# Patient Record
Sex: FEMALE | Race: WHITE | NOT HISPANIC OR LATINO | URBAN - METROPOLITAN AREA
[De-identification: names, ages, dates, MRNs, and addresses within clinical notes are randomized per-mention and may not be internally consistent; named-entity substitution may affect disease eponyms.]

---

## 2020-12-29 ENCOUNTER — EMERGENCY (EMERGENCY)
Facility: HOSPITAL | Age: 50
LOS: 0 days | Discharge: HOME | End: 2020-12-29
Attending: STUDENT IN AN ORGANIZED HEALTH CARE EDUCATION/TRAINING PROGRAM | Admitting: EMERGENCY MEDICINE
Payer: MEDICAID

## 2020-12-29 VITALS
DIASTOLIC BLOOD PRESSURE: 81 MMHG | OXYGEN SATURATION: 96 % | HEIGHT: 66 IN | RESPIRATION RATE: 24 BRPM | SYSTOLIC BLOOD PRESSURE: 133 MMHG | HEART RATE: 85 BPM | WEIGHT: 199.96 LBS | TEMPERATURE: 97 F

## 2020-12-29 VITALS
OXYGEN SATURATION: 99 % | DIASTOLIC BLOOD PRESSURE: 65 MMHG | TEMPERATURE: 96 F | SYSTOLIC BLOOD PRESSURE: 141 MMHG | HEART RATE: 99 BPM | RESPIRATION RATE: 19 BRPM

## 2020-12-29 DIAGNOSIS — E03.9 HYPOTHYROIDISM, UNSPECIFIED: ICD-10-CM

## 2020-12-29 DIAGNOSIS — F43.10 POST-TRAUMATIC STRESS DISORDER, UNSPECIFIED: ICD-10-CM

## 2020-12-29 DIAGNOSIS — Z88.1 ALLERGY STATUS TO OTHER ANTIBIOTIC AGENTS STATUS: ICD-10-CM

## 2020-12-29 DIAGNOSIS — J45.901 UNSPECIFIED ASTHMA WITH (ACUTE) EXACERBATION: ICD-10-CM

## 2020-12-29 DIAGNOSIS — R06.00 DYSPNEA, UNSPECIFIED: ICD-10-CM

## 2020-12-29 DIAGNOSIS — Z20.828 CONTACT WITH AND (SUSPECTED) EXPOSURE TO OTHER VIRAL COMMUNICABLE DISEASES: ICD-10-CM

## 2020-12-29 DIAGNOSIS — F31.9 BIPOLAR DISORDER, UNSPECIFIED: ICD-10-CM

## 2020-12-29 DIAGNOSIS — F32.9 MAJOR DEPRESSIVE DISORDER, SINGLE EPISODE, UNSPECIFIED: ICD-10-CM

## 2020-12-29 LAB
ALBUMIN SERPL ELPH-MCNC: 4.3 G/DL — SIGNIFICANT CHANGE UP (ref 3.5–5.2)
ALP SERPL-CCNC: 100 U/L — SIGNIFICANT CHANGE UP (ref 30–115)
ALT FLD-CCNC: 14 U/L — SIGNIFICANT CHANGE UP (ref 0–41)
ANION GAP SERPL CALC-SCNC: 15 MMOL/L — HIGH (ref 7–14)
APAP SERPL-MCNC: <5 UG/ML — LOW (ref 10–30)
AST SERPL-CCNC: 17 U/L — SIGNIFICANT CHANGE UP (ref 0–41)
BASOPHILS # BLD AUTO: 0.09 K/UL — SIGNIFICANT CHANGE UP (ref 0–0.2)
BASOPHILS NFR BLD AUTO: 0.8 % — SIGNIFICANT CHANGE UP (ref 0–1)
BILIRUB SERPL-MCNC: 0.2 MG/DL — SIGNIFICANT CHANGE UP (ref 0.2–1.2)
BUN SERPL-MCNC: 11 MG/DL — SIGNIFICANT CHANGE UP (ref 10–20)
CALCIUM SERPL-MCNC: 9.7 MG/DL — SIGNIFICANT CHANGE UP (ref 8.5–10.1)
CHLORIDE SERPL-SCNC: 103 MMOL/L — SIGNIFICANT CHANGE UP (ref 98–110)
CO2 SERPL-SCNC: 21 MMOL/L — SIGNIFICANT CHANGE UP (ref 17–32)
CREAT SERPL-MCNC: 0.8 MG/DL — SIGNIFICANT CHANGE UP (ref 0.7–1.5)
EOSINOPHIL # BLD AUTO: 0.2 K/UL — SIGNIFICANT CHANGE UP (ref 0–0.7)
EOSINOPHIL NFR BLD AUTO: 1.8 % — SIGNIFICANT CHANGE UP (ref 0–8)
ETHANOL SERPL-MCNC: <10 MG/DL — SIGNIFICANT CHANGE UP
GLUCOSE SERPL-MCNC: 119 MG/DL — HIGH (ref 70–99)
HCT VFR BLD CALC: 42.1 % — SIGNIFICANT CHANGE UP (ref 37–47)
HGB BLD-MCNC: 14.1 G/DL — SIGNIFICANT CHANGE UP (ref 12–16)
IMM GRANULOCYTES NFR BLD AUTO: 0.4 % — HIGH (ref 0.1–0.3)
LYMPHOCYTES # BLD AUTO: 1.71 K/UL — SIGNIFICANT CHANGE UP (ref 1.2–3.4)
LYMPHOCYTES # BLD AUTO: 15.2 % — LOW (ref 20.5–51.1)
MCHC RBC-ENTMCNC: 30.9 PG — SIGNIFICANT CHANGE UP (ref 27–31)
MCHC RBC-ENTMCNC: 33.5 G/DL — SIGNIFICANT CHANGE UP (ref 32–37)
MCV RBC AUTO: 92.1 FL — SIGNIFICANT CHANGE UP (ref 81–99)
MONOCYTES # BLD AUTO: 0.27 K/UL — SIGNIFICANT CHANGE UP (ref 0.1–0.6)
MONOCYTES NFR BLD AUTO: 2.4 % — SIGNIFICANT CHANGE UP (ref 1.7–9.3)
NEUTROPHILS # BLD AUTO: 8.96 K/UL — HIGH (ref 1.4–6.5)
NEUTROPHILS NFR BLD AUTO: 79.4 % — HIGH (ref 42.2–75.2)
NRBC # BLD: 0 /100 WBCS — SIGNIFICANT CHANGE UP (ref 0–0)
PLATELET # BLD AUTO: 249 K/UL — SIGNIFICANT CHANGE UP (ref 130–400)
POTASSIUM SERPL-MCNC: 4 MMOL/L — SIGNIFICANT CHANGE UP (ref 3.5–5)
POTASSIUM SERPL-SCNC: 4 MMOL/L — SIGNIFICANT CHANGE UP (ref 3.5–5)
PROT SERPL-MCNC: 6.6 G/DL — SIGNIFICANT CHANGE UP (ref 6–8)
RBC # BLD: 4.57 M/UL — SIGNIFICANT CHANGE UP (ref 4.2–5.4)
RBC # FLD: 12.6 % — SIGNIFICANT CHANGE UP (ref 11.5–14.5)
SALICYLATES SERPL-MCNC: <0.3 MG/DL — LOW (ref 4–30)
SARS-COV-2 RNA SPEC QL NAA+PROBE: SIGNIFICANT CHANGE UP
SODIUM SERPL-SCNC: 139 MMOL/L — SIGNIFICANT CHANGE UP (ref 135–146)
WBC # BLD: 11.28 K/UL — HIGH (ref 4.8–10.8)
WBC # FLD AUTO: 11.28 K/UL — HIGH (ref 4.8–10.8)

## 2020-12-29 PROCEDURE — 93010 ELECTROCARDIOGRAM REPORT: CPT

## 2020-12-29 PROCEDURE — 90792 PSYCH DIAG EVAL W/MED SRVCS: CPT

## 2020-12-29 PROCEDURE — 99285 EMERGENCY DEPT VISIT HI MDM: CPT

## 2020-12-29 PROCEDURE — 71045 X-RAY EXAM CHEST 1 VIEW: CPT | Mod: 26

## 2020-12-29 PROCEDURE — 99053 MED SERV 10PM-8AM 24 HR FAC: CPT

## 2020-12-29 RX ORDER — ALBUTEROL 90 UG/1
1 AEROSOL, METERED ORAL ONCE
Refills: 0 | Status: COMPLETED | OUTPATIENT
Start: 2020-12-29 | End: 2020-12-29

## 2020-12-29 RX ORDER — IPRATROPIUM/ALBUTEROL SULFATE 18-103MCG
3 AEROSOL WITH ADAPTER (GRAM) INHALATION ONCE
Refills: 0 | Status: DISCONTINUED | OUTPATIENT
Start: 2020-12-29 | End: 2020-12-29

## 2020-12-29 RX ORDER — IPRATROPIUM/ALBUTEROL SULFATE 18-103MCG
3 AEROSOL WITH ADAPTER (GRAM) INHALATION
Refills: 0 | Status: COMPLETED | OUTPATIENT
Start: 2020-12-29 | End: 2020-12-29

## 2020-12-29 RX ORDER — ALPRAZOLAM 0.25 MG
0.5 TABLET ORAL ONCE
Refills: 0 | Status: DISCONTINUED | OUTPATIENT
Start: 2020-12-29 | End: 2020-12-29

## 2020-12-29 RX ORDER — DEXAMETHASONE 0.5 MG/5ML
10 ELIXIR ORAL ONCE
Refills: 0 | Status: COMPLETED | OUTPATIENT
Start: 2020-12-29 | End: 2020-12-29

## 2020-12-29 RX ORDER — IPRATROPIUM/ALBUTEROL SULFATE 18-103MCG
3 AEROSOL WITH ADAPTER (GRAM) INHALATION ONCE
Refills: 0 | Status: COMPLETED | OUTPATIENT
Start: 2020-12-29 | End: 2020-12-29

## 2020-12-29 RX ADMIN — Medication 10 MILLIGRAM(S): at 05:36

## 2020-12-29 RX ADMIN — Medication 3 MILLILITER(S): at 05:40

## 2020-12-29 RX ADMIN — Medication 3 MILLILITER(S): at 06:28

## 2020-12-29 RX ADMIN — Medication 3 MILLILITER(S): at 08:17

## 2020-12-29 RX ADMIN — ALBUTEROL 1 PUFF(S): 90 AEROSOL, METERED ORAL at 11:15

## 2020-12-29 RX ADMIN — Medication 3 MILLILITER(S): at 05:36

## 2020-12-29 RX ADMIN — Medication 0.5 MILLIGRAM(S): at 06:51

## 2020-12-29 NOTE — ED PROVIDER NOTE - PHYSICAL EXAMINATION
CONSTITUTIONAL: Well-developed; well-nourished; in no acute distress.   SKIN: warm, dry  HEAD: Normocephalic; atraumatic.  EYES: no conjunctival injection. EOMI.   ENT: No nasal discharge; airway clear.  NECK: Supple; non tender.  CARD: S1, S2 normal; no murmurs, gallops, or rubs. Regular rate and rhythm.   RESP: +wheezing throughout all lung fields   ABD: soft ntnd. no CVA TTP.   EXT: Normal ROM.  No LE edema.   LYMPH: No acute cervical adenopathy.  NEURO: Alert, oriented, grossly unremarkable. No FND.   PSYCH: Cooperative, appropriate.

## 2020-12-29 NOTE — ED BEHAVIORAL HEALTH ASSESSMENT NOTE - SUMMARY
50 year old female, never , has no children, with psychiatric history of PTSD (childhood trauma), Bipolar, last inpatient psych admission in May of 2017 for bipolar symptoms, currently not in treatment,  past medical history of asthma, hypothyroidism, PCOS presents to ED with asthma exacerbation, psychiatry is consulted for suicidal ideation with no intent or plan.    Underlying suicidal ideation expressed earlier are in the context of stressors. She is not exhibiting suicidal ideation. 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 Currently exhibiting depressive symptoms in the context of multiple psychosocial stressors including finance, limiting support. There is no active manic or psychotic symptoms noted. She is very guarded in discussing underlying PTSD trauma, but the presentation does not appear to be related to acute symptoms. Patient is guarded with some odd behaviors that are not inappropriate, with strong features of questionable underlying personality disorder.    Plan  There is no indication for inpatient psych admission,  Patient expresses no interest psychotropic medications, but would like to continue therapy, which was helpful in the past.   -there is no psychiatric contraindication for discharge  -Patient will be referred to [facility] Outpatient Partial program, [address] [place] [id number]   Patient will be reached by [facility] Partial program today for initial appointment date.

## 2020-12-29 NOTE — ED BEHAVIORAL HEALTH ASSESSMENT NOTE - DETAILS
Depression, and personality difficulty breathing. Spoke with primary team at [phone] [facility] See above See  plan
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
## 2020-12-29 NOTE — ED BEHAVIORAL HEALTH ASSESSMENT NOTE - DESCRIPTION
See above See HPI 1 year old female, never , has no children. For undergraduate degree from [employer],  for graduate degree she attended [employer]

## 2020-12-29 NOTE — ED PROVIDER NOTE - CARE PROVIDER_API CALL
Brett Meier  INTERNAL MEDICINE  [address] [place] [address], [address]  [place] [address]  Phone: [phone]  Fax: [phone]  Follow Up Time: 1-3 Days

## 2020-12-29 NOTE — ED BEHAVIORAL HEALTH ASSESSMENT NOTE - PAST PSYCHOTROPIC MEDICATION
Most recent regiment include Lithium, tegretol and Zoloft. Reportedly she experienced liver failure on tegretol.

## 2020-12-29 NOTE — CHART NOTE - NSCHARTNOTEFT_GEN_A_CORE
Pt is a 50 year old female who presented to the ED with a c/o cough and difficulty breathing x 1 week.  Pt reported a PMH of Asthma, Hypothyroidism and PCOS.      Pt also reported that she does not have a PCP.  At pt's request, ROBBI sent an email to the [facility] requesting that a staff member call pt and schedule an appointment for her with a PCP.      Supportive counseling was provided to pt.  ROBBI gave pt resources for OPM[facility], Medicaid, SNAP and food pantries.  Pt agreed to call ROBBI if any other needs arise.
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
## 2020-12-29 NOTE — ED ADULT NURSE NOTE - OBJECTIVE STATEMENT
Pt reports SOB and cough x 1 week with hx of asthma. Pt reports hx of PTSD, anxiety and recently moved from [place] to [place] due to [place].  Denies chest pain, fever, nausea, vomiting, and diarrhea. Pt Anxious, tachypnea, b/l wheezing.

## 2020-12-29 NOTE — ED PROVIDER NOTE - OBJECTIVE STATEMENT
51 y/o F PMHx asthma, hypothyroidism, PCOS presents to ED with asthma exacerbation. Pt reports she has been having cough and SOB for two weeks. Denies any chest pain, vomiting, nausea. No fevers at home. 51 y/o F PMHx asthma, hypothyroidism, PCOS presents to ED with asthma exacerbation. Pt reports she has been having cough and SOB for two weeks. Denies any chest pain, vomiting, nausea. No fevers at home. Pt ran out of nebs/albuterol at home prompting ED visit.

## 2020-12-29 NOTE — ED PROVIDER NOTE - NSFOLLOWUPINSTRUCTIONS_ED_ALL_ED_FT
Asthma    Asthma is a recurring condition in which the airways tighten and narrow, making it difficult to breath. Asthma exacerbations, also called asthma attacks, range from minor to life-threatening. Symptoms include wheezing, coughing, chest tightness, or shortness of breath. The diagnosis of asthma is made by a review of your medical history and a physical exam, but may involve additional testing. Asthma cannot be cured, but medicines and lifestyle changes can help control it. Avoid triggers of asthma which may include animal dander, pollen, mold, smoke, air pollutants, etc.     SEEK IMMEDIATE MEDICAL CARE IF YOU HAVE THE FOLLOWING SYMPTOMS: worsening of symptoms, symptoms that do not respond to medication, shortness of breath at rest, chest pain, bluish discoloration to lips or fingertips, lightheadedness/dizziness, or fever. Asthma    Asthma is a recurring condition in which the airways tighten and narrow, making it difficult to breath. Asthma exacerbations, also called asthma attacks, range from minor to life-threatening. Symptoms include wheezing, coughing, chest tightness, or shortness of breath. The diagnosis of asthma is made by a review of your medical history and a physical exam, but may involve additional testing. Asthma cannot be cured, but medicines and lifestyle changes can help control it. Avoid triggers of asthma which may include animal dander, pollen, mold, smoke, air pollutants, etc.     SEEK IMMEDIATE MEDICAL CARE IF YOU HAVE THE FOLLOWING SYMPTOMS: worsening of symptoms, symptoms that do not respond to medication, shortness of breath at rest, chest pain, bluish discoloration to lips or fingertips, lightheadedness/dizziness, or fever.      Depression    Depression is a mental illness that usually causes feelings of sadness, hopelessness, or helplessness. Some people with this disorder do not feel particularly sad but lose interest in doing things they used to enjoy. Major depressive disorder also can cause physical symptoms. It can interfere with work, school, relationships, and other normal everyday activities. If you were started on a medication, make sure to take exactly as prescribed and follow up with a psychiatrist.    SEEK IMMEDIATE MEDICAL CARE IF YOU HAVE ANY OF THE FOLLOWING SYMPTOMS: thoughts about hurting or killing yourself, thoughts about hurting or killing somebody else, hallucinations, or worsening depression.

## 2020-12-29 NOTE — BH SAFETY PLAN - CRISIS CLINICIAN NAME 1
[facility] Outpatient Partial program, [address] [place] [phone]

## 2020-12-29 NOTE — ED PROVIDER NOTE - NS ED ROS FT
Review of Systems:  CONSTITUTIONAL: No fever, No diaphoresis   SKIN: No rash  HEMATOLOGIC: No abnormal bleeding or bruising  EYES: No eye pain, No blurred vision  ENT: No sore throat, No neck pain, No rhinorrhea   RESPIRATORY: +shortness of breath, +cough  CARDIAC: No chest pain, No palpitations  GI: No abdominal pain, No nausea, No vomiting, No diarrhea, No constipation, No bright red blood per rectum or melena. No flank pain  : No dysuria, frequency, hematuria.   MUSCULOSKELETAL: No joint paint, No swelling, No back pain  NEUROLOGIC: No numbness, No focal weakness, No headache, No dizziness  All other systems negative, unless specified in HPI

## 2020-12-29 NOTE — ED PROVIDER NOTE - PATIENT PORTAL LINK FT
You can access the FollowMyHealth Patient Portal offered by [facility] by registering at the following website: [email or web address]. By joining [employer]’s FollowMyHealth portal, you will also be able to view your health information using other applications (apps) compatible with our system. You can access the FollowMyHealth Patient Portal offered by [facility] by registering at the following website: [email or web address]. By joining [email or web address]’s FollowMyHealth portal, you will also be able to view your health information using other applications (apps) compatible with our system.

## 2020-12-29 NOTE — ED PROVIDER NOTE - ATTENDING CONTRIBUTION TO CARE
50F h/o asthma, hypothyroidism, pcos, ptsd p/w asthma exac x sev wks. Cough & sob. Pt recently moved back to [place] from [place] ~2 mos ago, rpts asthma sx x sev weeks, ran out of her inhl medication, currently has no med ins. Has been hesitant to come to ED due to covid pandemic. Denies f/c, cp, nvd, abd pain, rash. No PMD.    PE:  nad  skin warm, dry  ncat  neck supple  speaking in short sentences, nl wob, decr air entry bl, diff wheezing bl, no rales/rhonchi  ctab no wrr  abd soft ntnd no palpable masses no rgr  back non-tender no cvat  ext no cce dpi  neuro aaox3 grossly nf exam

## 2020-12-29 NOTE — ED PROVIDER NOTE - SHIFT CHANGE DETAILS
f/u cxr, psych/SW consult in AM, reassess, dispo f/u cxr, sw recs, psych consult in AM, reassess, dispo f/u cxr, reassess, dispo

## 2020-12-29 NOTE — ED BEHAVIORAL HEALTH ASSESSMENT NOTE - OTHER PAST PSYCHIATRIC HISTORY (INCLUDE DETAILS REGARDING ONSET, COURSE OF ILLNESS, INPATIENT/OUTPATIENT TREATMENT)
Long history of PTSD (childhood trauma).  Psychiatric history of bipolar, last inpatient psych admission in May of 2017 for bipolar symptoms, currently not in treatment. Most recent regiment include Lithium, tegretol and Zoloft. Reportedly she experienced liver failure on tegretol.

## 2020-12-29 NOTE — ED PROVIDER NOTE - NSFOLLOWUPCLINICS_GEN_ALL_ED_FT
[facility] Pulmonolgy and Sleep Medicine  Pulmonology  [address] [place] [address], [place] [address]  Phone: [phone]  Fax:   Follow Up Time: Routine

## 2020-12-29 NOTE — ED PROVIDER NOTE - PROGRESS NOTE DETAILS
s/w ROBBI Richey, will come assess pt in ED called to bedside by RN, pt c/o anxiety and requesting to s/w ROBBI - s/w ROBBI Richey, will come assess pt in ED per ROBBI Richey pt will be contacted to get set up with PMD and medical ins options JR: patient reassessed at the bedside. Moderate inspiratory/expiratory wheezing RLL. Patient reports interval improvement in Sx. She requests eval by psych- they will come at 8:30am. Pt ok with waiting for psych eval. Will give duoneb tx x1 and reassess. DC: Mild end expiratory after nebs and duoneb. Will order another duoneb. Patient with his of PTSD, suicidal attempt in past, endorsing SI at this time and increased anxiety. Patient requesting to be evaluated by psychiatry. Consult placed. DC: Cleared by psychiatry with outpatient follow up. DC: Cleared by psychiatry with outpatient follow up. Dr. Byrnes will give partial program patient's contact info and they will call her to make an appointment.
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
## 2021-02-10 ENCOUNTER — APPOINTMENT (OUTPATIENT)
Age: 51
End: 2021-02-10
Payer: COMMERCIAL

## 2021-02-10 ENCOUNTER — OUTPATIENT (OUTPATIENT)
Dept: OUTPATIENT SERVICES | Facility: HOSPITAL | Age: 51
LOS: 1 days | Discharge: HOME | End: 2021-02-10

## 2021-02-10 VITALS
WEIGHT: 200 LBS | SYSTOLIC BLOOD PRESSURE: 127 MMHG | BODY MASS INDEX: 32.14 KG/M2 | HEART RATE: 87 BPM | DIASTOLIC BLOOD PRESSURE: 85 MMHG | HEIGHT: 66 IN | TEMPERATURE: 99.2 F

## 2021-02-10 DIAGNOSIS — Z78.9 OTHER SPECIFIED HEALTH STATUS: ICD-10-CM

## 2021-02-10 DIAGNOSIS — Z86.59 PERSONAL HISTORY OF OTHER MENTAL AND BEHAVIORAL DISORDERS: ICD-10-CM

## 2021-02-10 DIAGNOSIS — L40.9 PSORIASIS, UNSPECIFIED: ICD-10-CM

## 2021-02-10 DIAGNOSIS — R59.9 ENLARGED LYMPH NODES, UNSPECIFIED: ICD-10-CM

## 2021-02-10 PROCEDURE — 99203 OFFICE O/P NEW LOW 30 MIN: CPT | Mod: GC

## 2021-02-11 DIAGNOSIS — L60.9 NAIL DISORDER, UNSPECIFIED: ICD-10-CM

## 2021-02-11 DIAGNOSIS — Z00.00 ENCOUNTER FOR GENERAL ADULT MEDICAL EXAMINATION WITHOUT ABNORMAL FINDINGS: ICD-10-CM

## 2021-02-11 DIAGNOSIS — F32.9 MAJOR DEPRESSIVE DISORDER, SINGLE EPISODE, UNSPECIFIED: ICD-10-CM

## 2021-02-11 DIAGNOSIS — R59.9 ENLARGED LYMPH NODES, UNSPECIFIED: ICD-10-CM

## 2021-02-17 PROBLEM — Z86.59 HISTORY OF DEPRESSION: Status: RESOLVED | Noted: 2021-02-17 | Resolved: 2021-02-17

## 2021-02-17 PROBLEM — Z86.59 HISTORY OF ANXIETY: Status: RESOLVED | Noted: 2021-02-17 | Resolved: 2021-02-17

## 2021-02-17 PROBLEM — Z78.9 NON-SMOKER: Status: ACTIVE | Noted: 2021-02-17

## 2021-02-17 NOTE — HISTORY OF PRESENT ILLNESS
[FreeTextEntry1] : Pt here to establish care  [de-identified] : Ms. Lira is a 51 y/o female with pmhx of PCOS asthma, psoriatic arthritis, depression and anxiety, who comes to the clinic to establish care. She complains of a mobile, rubbery, nodule in her left chest wall, beginning 3 years ago, that was never looked at. She also states that she began menopause 2 years, LMP mid-septmeber of 2018, and she attributes exacerbations of her depression and anxiety to that She does admit to occasional, chills, night sweats, hot flashes. She has a history of hospitalization due to psychiatrics episodes. Last episode 1 week ago. She has a history of suicidality. She states that she has no suicidal plan, but does admit to occasional ideation. She was at [facility] ER on December 29th for an asthma exacerbation, discharged with Ventolin.

## 2021-02-17 NOTE — ASSESSMENT
[FreeTextEntry1] : Ms. Lira is a 49 y/o female with pmhx of PCOS asthma, psoriatic arthritis, depression and anxiety, who comes to the clinic to establish care. She complains of a mobile, rubbery, nodule in her left chest wall, beginning 3 years ago, that was never looked at. \par \par #1 Left Anterior Chest wall nodule \par -Referred for mammogram \par -Referred to surgery for possible biopsy \par -Follow up 1 month \par \par #2 Asthma \par -Refilled Venotlin rescue inhaler \par -Begin Advair 250 mg once daily \par -Follow up in 1 month \par \par #3 depression/anxiety/PTSD\par -referred to outpatient psychiatric services \par \par \par #4 HCM \par -Referred for mammogram \par -Referred for pap smear \par -Referred for colonoscopy \par -Ordered routine bloodwork \par \par BTC 1 month

## 2021-02-17 NOTE — PHYSICAL EXAM
[Normal] : affect was normal and insight and judgment were intact [de-identified] : Enlarged Cllavicular lymph node, size of wallnut. Mobile, hard, nontender.

## 2021-02-17 NOTE — HEALTH RISK ASSESSMENT
covid swab sent to lab   [Good] : ~his/her~ current health as good [Poor] : ~his/her~ mood as  poor [No] : No [Monthly or less (1 pt)] : Monthly or less (1 point) [No falls in past year] : Patient reported no falls in the past year [3] : 1) Little interest or pleasure doing things for nearly every day (3) [2] : 2) Feeling down, depressed, or hopeless for more than half of the days (2) [Change in mental status noted] : Change in mental status noted [Alone] : lives alone [Unemployed] : unemployed [Graduate School] : graduate school [Single] : single [] : No [Sexually Active] : not sexually active

## 2021-02-17 NOTE — REVIEW OF SYSTEMS
[Chills] : chills [Fatigue] : fatigue [Hot Flashes] : hot flashes [Night Sweats] : night sweats [Fever] : no fever [Recent Change In Weight] : ~T no recent weight change [Discharge] : no discharge [Pain] : no pain [Redness] : no redness [Dryness] : no dryness  [Vision Problems] : no vision problems [Itching] : no itching [Earache] : no earache [Hearing Loss] : no hearing loss [Nosebleed] : no nosebleeds [Hoarseness] : no hoarseness [Nasal Discharge] : no nasal discharge [Sore Throat] : no sore throat [Postnasal Drip] : no postnasal drip [Chest Pain] : no chest pain [Palpitations] : no palpitations [Leg Claudication] : no leg claudication [Lower Ext Edema] : no lower extremity edema [Orthopnea] : no orthopnea [Paroxysmal Nocturnal Dyspnea] : no paroxysmal nocturnal dyspnea [Shortness Of Breath] : no shortness of breath [Wheezing] : no wheezing [Cough] : no cough [Dyspnea on Exertion] : no dyspnea on exertion [Abdominal Pain] : no abdominal pain [Nausea] : no nausea [Constipation] : no constipation [Diarrhea] : diarrhea [Vomiting] : no vomiting [Heartburn] : no heartburn [Melena] : no melena [Dysuria] : no dysuria [Incontinence] : no incontinence [Nocturia] : no nocturia [Poor Libido] : libido not poor [Hematuria] : no hematuria [Frequency] : no frequency [Vaginal Discharge] : no vaginal discharge [Dysmenorrhea] : no dysmenorrhea [Joint Pain] : no joint pain [Joint Stiffness] : no joint stiffness [Joint Swelling] : no joint swelling [Muscle Weakness] : no muscle weakness [Muscle Pain] : no muscle pain [Back Pain] : no back pain [Itching] : no itching [Mole Changes] : no mole changes [Nail Changes] : no nail changes [Hair Changes] : no hair changes [Skin Rash] : no skin rash [Headache] : no headache [Dizziness] : no dizziness [Fainting] : no fainting [Confusion] : no confusion [Memory Loss] : no memory loss [Unsteady Walking] : no ataxia [Suicidal] : not suicidal [Insomnia] : no insomnia [Anxiety] : no anxiety [Depression] : no depression [Easy Bleeding] : no easy bleeding [Easy Bruising] : no easy bruising [Swollen Glands] : no swollen glands

## 2021-04-09 ENCOUNTER — APPOINTMENT (OUTPATIENT)
Dept: GASTROENTEROLOGY | Facility: CLINIC | Age: 51
End: 2021-04-09

## 2021-04-23 ENCOUNTER — APPOINTMENT (OUTPATIENT)
Dept: GASTROENTEROLOGY | Facility: CLINIC | Age: 51
End: 2021-04-23
Payer: MEDICAID

## 2021-04-23 ENCOUNTER — OUTPATIENT (OUTPATIENT)
Dept: OUTPATIENT SERVICES | Facility: HOSPITAL | Age: 51
LOS: 1 days | Discharge: HOME | End: 2021-04-23

## 2021-04-23 PROCEDURE — 99204 OFFICE O/P NEW MOD 45 MIN: CPT | Mod: 95,GE

## 2021-04-23 NOTE — ASSESSMENT
[FreeTextEntry1] : 50 year old female with PMH of asthma and depression is here for colorectal cancer screening. on ROS pt endorses RUQ abdominal pain, intermittent, colicky, increased with food intake. she denies nausea, vomiting, hematemesis, melena or hematochezia. she denies wt loss or anemia.\par \par # CRC screening: average risk\par - will schedule for colonoscopy (pt understands risk / benefits and alternatives)\par - will send prep to the pharmacy \par \par # RUQ pain: r/o biliary colic \par - will get RUQ US

## 2021-04-23 NOTE — HISTORY OF PRESENT ILLNESS
[de-identified] : 50 year old female with PMH of asthma and depression is here for colorectal cancer screening. on ROS pt endorses RUQ abdominal pain, intermittent, colicky, increased with food intake. she denies nausea, vomiting, hematemesis, melena or hematochezia. she denies wt loss or anemia.\par endorses 1 BM daily of formed brown stool. \par \par FH: CRC in maternal grandmother at age of 93.\par no prior EGD/Colonoscopy.

## 2021-04-23 NOTE — REASON FOR VISIT
[Home] : at home, [unfilled] , at the time of the visit. [[facility] ([facility]] : at the medical office located in  [Verbal consent obtained from patient] : the patient, [unfilled] [Initial Evaluation] : an initial evaluation

## 2021-04-28 DIAGNOSIS — Z00.00 ENCOUNTER FOR GENERAL ADULT MEDICAL EXAMINATION WITHOUT ABNORMAL FINDINGS: ICD-10-CM

## 2021-07-23 ENCOUNTER — NON-APPOINTMENT (OUTPATIENT)
Age: 51
End: 2021-07-23

## 2021-08-11 ENCOUNTER — APPOINTMENT (OUTPATIENT)
Dept: INTERNAL MEDICINE | Facility: CLINIC | Age: 51
End: 2021-08-11
Payer: MEDICAID

## 2021-08-11 PROCEDURE — ZZZZZ: CPT

## 2021-08-16 NOTE — REVIEW OF SYSTEMS
[Chills] : chills [Fatigue] : fatigue [Hot Flashes] : hot flashes [Night Sweats] : night sweats [Fever] : no fever [Recent Change In Weight] : ~T no recent weight change [Discharge] : no discharge [Pain] : no pain [Redness] : no redness [Dryness] : no dryness  [Vision Problems] : no vision problems [Earache] : no earache [Itching] : no itching [Hearing Loss] : no hearing loss [Nosebleed] : no nosebleeds [Hoarseness] : no hoarseness [Nasal Discharge] : no nasal discharge [Sore Throat] : no sore throat [Postnasal Drip] : no postnasal drip [Chest Pain] : no chest pain [Palpitations] : no palpitations [Leg Claudication] : no leg claudication [Lower Ext Edema] : no lower extremity edema [Orthopnea] : no orthopnea [Paroxysmal Nocturnal Dyspnea] : no paroxysmal nocturnal dyspnea [Wheezing] : no wheezing [Shortness Of Breath] : no shortness of breath [Cough] : no cough [Dyspnea on Exertion] : no dyspnea on exertion [Abdominal Pain] : no abdominal pain [Nausea] : no nausea [Constipation] : no constipation [Diarrhea] : diarrhea [Vomiting] : no vomiting [Heartburn] : no heartburn [Melena] : no melena [Dysuria] : no dysuria [Incontinence] : no incontinence [Nocturia] : no nocturia [Poor Libido] : libido not poor [Hematuria] : no hematuria [Frequency] : no frequency [Vaginal Discharge] : no vaginal discharge [Dysmenorrhea] : no dysmenorrhea [Joint Stiffness] : no joint stiffness [Joint Pain] : no joint pain [Joint Swelling] : no joint swelling [Muscle Weakness] : no muscle weakness [Muscle Pain] : no muscle pain [Back Pain] : no back pain [Itching] : no itching [Mole Changes] : no mole changes [Nail Changes] : no nail changes [Hair Changes] : no hair changes [Skin Rash] : no skin rash [Dizziness] : no dizziness [Headache] : no headache [Fainting] : no fainting [Confusion] : no confusion [Memory Loss] : no memory loss [Unsteady Walking] : no ataxia [Suicidal] : not suicidal [Insomnia] : no insomnia [Anxiety] : no anxiety [Depression] : no depression [Easy Bleeding] : no easy bleeding [Easy Bruising] : no easy bruising [Swollen Glands] : no swollen glands

## 2021-08-16 NOTE — HISTORY OF PRESENT ILLNESS
[Home] : at home, [unfilled] , at the time of the visit. [Medical Office: ([facility])___] : at the medical office located in  [Verbal consent obtained from patient] : the patient, [unfilled] [FreeTextEntry1] : Phone follow up [de-identified] : Ms. Lira is a 49 y/o female with pmhx of PCOS asthma, psoriatic arthritis, depression and anxiety, presented for phone follow up. Patient repoprts increased level of anxiety. She is under care of her therapist. She is denying any suicidal thought.\par Her asthma seems to be better controlled.

## 2021-08-16 NOTE — ASSESSMENT
[FreeTextEntry1] : Ms. Lira is a 49 y/o female with pmhx of S asthma, psoriatic arthritis, depression and anxiety. Patient reports that she was unable to do most of the recommended plan.\par \par #1 Left Anterior Chest wall nodule \par - mammogram \par -Referred to surgery for possible biopsy \par \par \par #2 Asthma \par -Continue Ventolin\par -Begin Advair 250 mg once daily \par \par \par #3 depression/anxiety/PTSD\par -referred to outpatient psychiatric services \par \par \par #4 HCM \par -Referred for mammogram \par -Referred for pap smear \par -Referred for colonoscopy \par - routine blood work ordered. \par \par

## 2021-11-26 ENCOUNTER — LABORATORY RESULT (OUTPATIENT)
Age: 51
End: 2021-11-26

## 2021-11-26 ENCOUNTER — TRANSCRIPTION ENCOUNTER (OUTPATIENT)
Age: 51
End: 2021-11-26

## 2021-11-29 ENCOUNTER — APPOINTMENT (OUTPATIENT)
Dept: INTERNAL MEDICINE | Facility: CLINIC | Age: 51
End: 2021-11-29
Payer: COMMERCIAL

## 2021-11-29 ENCOUNTER — OUTPATIENT (OUTPATIENT)
Dept: OUTPATIENT SERVICES | Facility: HOSPITAL | Age: 51
LOS: 1 days | Discharge: HOME | End: 2021-11-29

## 2021-11-29 VITALS
DIASTOLIC BLOOD PRESSURE: 87 MMHG | SYSTOLIC BLOOD PRESSURE: 134 MMHG | BODY MASS INDEX: 32.14 KG/M2 | TEMPERATURE: 98.6 F | HEIGHT: 66 IN | HEART RATE: 79 BPM | WEIGHT: 200 LBS

## 2021-11-29 LAB
ALBUMIN SERPL ELPH-MCNC: 4.6 G/DL
ALP BLD-CCNC: 114 U/L
ALT SERPL-CCNC: 18 U/L
ANION GAP SERPL CALC-SCNC: 14 MMOL/L
APPEARANCE: CLEAR
AST SERPL-CCNC: 22 U/L
BASOPHILS # BLD AUTO: 0.14 K/UL
BASOPHILS NFR BLD AUTO: 2.1 %
BILIRUB SERPL-MCNC: 0.4 MG/DL
BILIRUBIN URINE: NEGATIVE
BLOOD URINE: NEGATIVE
BUN SERPL-MCNC: 14 MG/DL
CALCIUM SERPL-MCNC: 9.6 MG/DL
CHLORIDE SERPL-SCNC: 104 MMOL/L
CHOLEST SERPL-MCNC: 250 MG/DL
CO2 SERPL-SCNC: 25 MMOL/L
COLOR: NORMAL
CREAT SERPL-MCNC: 0.8 MG/DL
CREAT SPEC-SCNC: 28 MG/DL
EOSINOPHIL # BLD AUTO: 0.73 K/UL
EOSINOPHIL NFR BLD AUTO: 11 %
ESTIMATED AVERAGE GLUCOSE: 97 MG/DL
GLUCOSE QUALITATIVE U: NEGATIVE
GLUCOSE SERPL-MCNC: 78 MG/DL
HBA1C MFR BLD HPLC: 5 %
HCT VFR BLD CALC: 40.1 %
HDLC SERPL-MCNC: 85 MG/DL
HGB BLD-MCNC: 13.1 G/DL
IMM GRANULOCYTES NFR BLD AUTO: 0.3 %
KETONES URINE: NEGATIVE
LDLC SERPL CALC-MCNC: 150 MG/DL
LEUKOCYTE ESTERASE URINE: ABNORMAL
LYMPHOCYTES # BLD AUTO: 2.15 K/UL
LYMPHOCYTES NFR BLD AUTO: 32.3 %
MAN DIFF?: NORMAL
MCHC RBC-ENTMCNC: 30.3 PG
MCHC RBC-ENTMCNC: 32.7 G/DL
MCV RBC AUTO: 92.6 FL
MICROALBUMIN 24H UR DL<=1MG/L-MCNC: <1.2 MG/DL
MICROALBUMIN/CREAT 24H UR-RTO: NORMAL MG/G
MONOCYTES # BLD AUTO: 0.38 K/UL
MONOCYTES NFR BLD AUTO: 5.7 %
NEUTROPHILS # BLD AUTO: 3.23 K/UL
NEUTROPHILS NFR BLD AUTO: 48.6 %
NITRITE URINE: NEGATIVE
NONHDLC SERPL-MCNC: 165 MG/DL
PH URINE: 7.5
PLATELET # BLD AUTO: 277 K/UL
POTASSIUM SERPL-SCNC: 4.5 MMOL/L
PROT SERPL-MCNC: 7 G/DL
PROTEIN URINE: NEGATIVE
RBC # BLD: 4.33 M/UL
RBC # FLD: 12.3 %
SODIUM SERPL-SCNC: 143 MMOL/L
SPECIFIC GRAVITY URINE: 1
T4 FREE SERPL-MCNC: 1.2 NG/DL
TRIGL SERPL-MCNC: 106 MG/DL
TSH SERPL-ACNC: 1.7 UIU/ML
UROBILINOGEN URINE: NORMAL
WBC # FLD AUTO: 6.65 K/UL

## 2021-11-29 PROCEDURE — 99213 OFFICE O/P EST LOW 20 MIN: CPT | Mod: GC

## 2021-11-29 RX ORDER — FLUTICASONE PROPIONATE AND SALMETEROL 50; 250 UG/1; UG/1
250-50 POWDER RESPIRATORY (INHALATION)
Qty: 1 | Refills: 3 | Status: COMPLETED | COMMUNITY
Start: 2021-02-10 | End: 2021-11-29

## 2021-11-30 DIAGNOSIS — G47.30 SLEEP APNEA, UNSPECIFIED: ICD-10-CM

## 2021-11-30 DIAGNOSIS — Z00.00 ENCOUNTER FOR GENERAL ADULT MEDICAL EXAMINATION WITHOUT ABNORMAL FINDINGS: ICD-10-CM

## 2021-12-01 NOTE — HISTORY OF PRESENT ILLNESS
[FreeTextEntry1] :  for the follow up, had covid vaccine 1 dose in may, never received second dose, asthma under control.\par Patient complaints on sleep problems, was previously diagnosed with sleep apnea. Wants to repeat sleep study. [de-identified] : Ms. Lira is a 52 y/o female with pmhx of PCOS asthma, psoriatic arthritis, depression and anxiety, who comes to the clinic  for the follow up, had covid vaccine 1 dose in may, never received second dose, asthma under control.\par Patient complaints on sleep problems, was previously diagnosed with sleep apnea. Wants to repeat sleep study.\par C/o anxiety, has not seen a psychiatrist after moving to [place].

## 2021-12-01 NOTE — ASSESSMENT
[FreeTextEntry1] : Ms. Lira is a 52 y/o female with pmhx of PCOS asthma, psoriatic arthritis, depression and anxiety, who comes to the clinic tofollow up\par \par #1 Left Anterior Chest wall nodule \par -Referred for mammogram \par -Referred to surgery for possible biopsy \par -Follow up 1 month \par \par #2 Asthma \par -Refilled Venotlin rescue inhaler \par -refilled Advair 250 mg once daily \par \par \par #3 depression/anxiety/PTSD\par -referred to outpatient psychiatric services \par \par \par #4 HCM \par -Referred for mammogram \par -Referred for pap smear \par -Referred for colonoscopy \par -Ordered routine bloodwork \par \par Meds renewed.

## 2021-12-01 NOTE — PHYSICAL EXAM
[Normal] : affect was normal and insight and judgment were intact [de-identified] : Enlarged Cllavicular lymph node, size of wallnut. Mobile, hard, nontender.

## 2021-12-30 ENCOUNTER — NON-APPOINTMENT (OUTPATIENT)
Age: 51
End: 2021-12-30

## 2022-05-16 NOTE — ED PROVIDER NOTE - NS_EDPROVIDERDISPOUSERTYPE_ED_A_ED
Pre Dialysis Assessment: Alert and oriented, conversant.      Pre Weight and Post Weight: 56.6Kg/54.5kg      Location/Access/Attempts: Right jujular tunneled double lumen HD catheter     Dressing status/changed: WNL/ last changed 5/9/22     Ultra-filtration Goal/ Actual:  1-2L/2L     Treatment Length:3.5hrs     Dialysate (K+/Ca) Indicate if changed during treatment: 2k/Ca     Summary of Treatment:      > Pt tolerated treatment w/o complications.     > Net UF removed 2L.     > VSS post treatment, report given to primary RN.     Attending Attestation (For Attendings USE Only)...

## 2022-06-28 ENCOUNTER — EMERGENCY (EMERGENCY)
Facility: HOSPITAL | Age: 52
LOS: 0 days | Discharge: HOME | End: 2022-06-28
Attending: EMERGENCY MEDICINE | Admitting: EMERGENCY MEDICINE

## 2022-06-28 VITALS
WEIGHT: 199.96 LBS | HEIGHT: 66 IN | HEART RATE: 80 BPM | RESPIRATION RATE: 18 BRPM | TEMPERATURE: 100 F | SYSTOLIC BLOOD PRESSURE: 151 MMHG | OXYGEN SATURATION: 96 % | DIASTOLIC BLOOD PRESSURE: 89 MMHG

## 2022-06-28 DIAGNOSIS — J45.909 UNSPECIFIED ASTHMA, UNCOMPLICATED: ICD-10-CM

## 2022-06-28 DIAGNOSIS — R07.89 OTHER CHEST PAIN: ICD-10-CM

## 2022-06-28 DIAGNOSIS — F12.90 CANNABIS USE, UNSPECIFIED, UNCOMPLICATED: ICD-10-CM

## 2022-06-28 DIAGNOSIS — M79.662 PAIN IN LEFT LOWER LEG: ICD-10-CM

## 2022-06-28 DIAGNOSIS — Z88.8 ALLERGY STATUS TO OTHER DRUGS, MEDICAMENTS AND BIOLOGICAL SUBSTANCES STATUS: ICD-10-CM

## 2022-06-28 DIAGNOSIS — Z88.1 ALLERGY STATUS TO OTHER ANTIBIOTIC AGENTS STATUS: ICD-10-CM

## 2022-06-28 LAB
ALBUMIN SERPL ELPH-MCNC: 4.3 G/DL — SIGNIFICANT CHANGE UP (ref 3.5–5.2)
ALP SERPL-CCNC: 102 U/L — SIGNIFICANT CHANGE UP (ref 30–115)
ALT FLD-CCNC: 11 U/L — SIGNIFICANT CHANGE UP (ref 0–41)
ANION GAP SERPL CALC-SCNC: 13 MMOL/L — SIGNIFICANT CHANGE UP (ref 7–14)
AST SERPL-CCNC: 14 U/L — SIGNIFICANT CHANGE UP (ref 0–41)
BASOPHILS # BLD AUTO: 0.09 K/UL — SIGNIFICANT CHANGE UP (ref 0–0.2)
BASOPHILS NFR BLD AUTO: 1.2 % — HIGH (ref 0–1)
BILIRUB SERPL-MCNC: 0.2 MG/DL — SIGNIFICANT CHANGE UP (ref 0.2–1.2)
BUN SERPL-MCNC: 15 MG/DL — SIGNIFICANT CHANGE UP (ref 10–20)
CALCIUM SERPL-MCNC: 9.5 MG/DL — SIGNIFICANT CHANGE UP (ref 8.5–10.1)
CHLORIDE SERPL-SCNC: 104 MMOL/L — SIGNIFICANT CHANGE UP (ref 98–110)
CO2 SERPL-SCNC: 26 MMOL/L — SIGNIFICANT CHANGE UP (ref 17–32)
CREAT SERPL-MCNC: 1.1 MG/DL — SIGNIFICANT CHANGE UP (ref 0.7–1.5)
D DIMER BLD IA.RAPID-MCNC: 167 NG/ML DDU — SIGNIFICANT CHANGE UP (ref 0–230)
EGFR: 60 ML/MIN/1.73M2 — SIGNIFICANT CHANGE UP
EOSINOPHIL # BLD AUTO: 0.55 K/UL — SIGNIFICANT CHANGE UP (ref 0–0.7)
EOSINOPHIL NFR BLD AUTO: 7.6 % — SIGNIFICANT CHANGE UP (ref 0–8)
GLUCOSE SERPL-MCNC: 97 MG/DL — SIGNIFICANT CHANGE UP (ref 70–99)
HCT VFR BLD CALC: 36.3 % — LOW (ref 37–47)
HGB BLD-MCNC: 12.5 G/DL — SIGNIFICANT CHANGE UP (ref 12–16)
IMM GRANULOCYTES NFR BLD AUTO: 0.3 % — SIGNIFICANT CHANGE UP (ref 0.1–0.3)
LYMPHOCYTES # BLD AUTO: 2.49 K/UL — SIGNIFICANT CHANGE UP (ref 1.2–3.4)
LYMPHOCYTES # BLD AUTO: 34.3 % — SIGNIFICANT CHANGE UP (ref 20.5–51.1)
MCHC RBC-ENTMCNC: 31.3 PG — HIGH (ref 27–31)
MCHC RBC-ENTMCNC: 34.4 G/DL — SIGNIFICANT CHANGE UP (ref 32–37)
MCV RBC AUTO: 91 FL — SIGNIFICANT CHANGE UP (ref 81–99)
MONOCYTES # BLD AUTO: 0.42 K/UL — SIGNIFICANT CHANGE UP (ref 0.1–0.6)
MONOCYTES NFR BLD AUTO: 5.8 % — SIGNIFICANT CHANGE UP (ref 1.7–9.3)
NEUTROPHILS # BLD AUTO: 3.68 K/UL — SIGNIFICANT CHANGE UP (ref 1.4–6.5)
NEUTROPHILS NFR BLD AUTO: 50.8 % — SIGNIFICANT CHANGE UP (ref 42.2–75.2)
NRBC # BLD: 0 /100 WBCS — SIGNIFICANT CHANGE UP (ref 0–0)
PLATELET # BLD AUTO: 230 K/UL — SIGNIFICANT CHANGE UP (ref 130–400)
POTASSIUM SERPL-MCNC: 4.1 MMOL/L — SIGNIFICANT CHANGE UP (ref 3.5–5)
POTASSIUM SERPL-SCNC: 4.1 MMOL/L — SIGNIFICANT CHANGE UP (ref 3.5–5)
PROT SERPL-MCNC: 6.2 G/DL — SIGNIFICANT CHANGE UP (ref 6–8)
RBC # BLD: 3.99 M/UL — LOW (ref 4.2–5.4)
RBC # FLD: 12.8 % — SIGNIFICANT CHANGE UP (ref 11.5–14.5)
SODIUM SERPL-SCNC: 143 MMOL/L — SIGNIFICANT CHANGE UP (ref 135–146)
TROPONIN T SERPL-MCNC: <0.01 NG/ML — SIGNIFICANT CHANGE UP
WBC # BLD: 7.25 K/UL — SIGNIFICANT CHANGE UP (ref 4.8–10.8)
WBC # FLD AUTO: 7.25 K/UL — SIGNIFICANT CHANGE UP (ref 4.8–10.8)

## 2022-06-28 PROCEDURE — 93010 ELECTROCARDIOGRAM REPORT: CPT

## 2022-06-28 PROCEDURE — 71045 X-RAY EXAM CHEST 1 VIEW: CPT | Mod: 26

## 2022-06-28 PROCEDURE — 99285 EMERGENCY DEPT VISIT HI MDM: CPT

## 2022-06-28 NOTE — ED ADULT TRIAGE NOTE - CHIEF COMPLAINT QUOTE
BIBA with complaints of left leg x few days and now with bruising/swelling. Now with chest pain in triage

## 2022-06-28 NOTE — ED PROVIDER NOTE - NS ED ROS FT
CONSTITUTIONAL: Negatve   SKIN: Negatve   HEAD: Negatve   EYES: Negatve   ENT: Negatve   NECK: Negatve   CARD:see hpi  RESP:Negatve   ABD: Negatve   EXT: see hpi  LYMPH: Negatve   NEURO: Negatve   PSYCH: Negatve

## 2022-06-28 NOTE — ED PROVIDER NOTE - PATIENT PORTAL LINK FT
You can access the FollowMyHealth Patient Portal offered by [facility] by registering at the following website: [email or web address]. By joining [employer]’s FollowMyHealth portal, you will also be able to view your health information using other applications (apps) compatible with our system.

## 2022-06-28 NOTE — ED PROVIDER NOTE - PROGRESS NOTE DETAILS
ekg non ischemic, cxr nad, labs and dimer neg.   results explained/given to pt.   return inst given.

## 2022-06-28 NOTE — ED PROVIDER NOTE - OBJECTIVE STATEMENT
52 yo f w hx of asthma co left leg pain, cramp like, sore feeling, mostly in left lateral and posterior calf. for several days. chest pain, soreness while in triage. not on oral hormones. smokes mj occasionally. powerwalks, plays guitar, active. no prolonged immobility. hx of dvt in family, none herself.

## 2022-06-28 NOTE — ED ADULT NURSE NOTE - OBJECTIVE STATEMENT
Patient c/o LLE pain. States swelling to the extremity, LOVELACE, pain when walking distance x1 wk. C/o bruising by toe with no known injury, and new onset chest pain. Family HX of DVT. Patient believes she has developed one,

## 2022-09-20 ENCOUNTER — APPOINTMENT (OUTPATIENT)
Dept: DERMATOLOGY | Facility: CLINIC | Age: 52
End: 2022-09-20

## 2022-09-20 ENCOUNTER — OUTPATIENT (OUTPATIENT)
Dept: OUTPATIENT SERVICES | Facility: HOSPITAL | Age: 52
LOS: 1 days | Discharge: HOME | End: 2022-09-20

## 2022-09-20 DIAGNOSIS — L40.9 PSORIASIS, UNSPECIFIED: ICD-10-CM

## 2022-09-20 PROCEDURE — 99204 OFFICE O/P NEW MOD 45 MIN: CPT | Mod: GC

## 2022-09-20 NOTE — END OF VISIT
[] : Resident [FreeTextEntry3] : localized psoriasis, try vitamin D analog, patient wishes to minimize steroid use

## 2022-09-20 NOTE — HISTORY OF PRESENT ILLNESS
[FreeTextEntry1] : Rash around my ankle and growing mole [de-identified] : The pt is a 53 yo F with PMHx s/f Asthma and  psoriatic arthritis presenting for a persistent rash on the right dorsal pedis and ankle and mole in the upper back. The patient states that although she has a triamcinolone cream at home, she uses it sparingly and only when the skin rash progresses to bleed and skin begins to crack. She states that she had previously been on multiple steroids growing up and would like to limit the amount of steroids she exposes herself to. To moisturize the area the patient has been relying on castor oil and coconut oil and states that she has been managing most of her symptoms through her diet.

## 2022-09-20 NOTE — PHYSICAL EXAM
[Alert] : alert [Oriented x 3] : ~L oriented x 3 [Well Nourished] : well nourished [Conjunctiva Non-injected] : conjunctiva non-injected [No Clubbing] : no clubbing [No Edema] : no edema [Hair] : Hair [Scalp] : Scalp [Face] : Face [Eyelids] : Eyelids [Ears] : Ears [Lips] : Lips [Neck] : Neck [Chest] : Chest [Abdomen] : Abdomen [Back] : Back [R Leg] : R Leg [L Leg] : L Leg [FreeTextEntry3] : RLE- Erythematous sclay plaque \par Mid upper back- small, well circumscribed, homogenous mole.

## 2022-09-20 NOTE — ASSESSMENT
[FreeTextEntry1] : 53 yo F with PMHx s/f Asthma and  psoriatic arthritis presenting for a persistent rash on the right dorsal pedis and ankle and mole in the mid upper back\par \par #Psoriatic arthritis rash\par Patient educated on the importance of moisturizing the affected area consistently\par -Calcipotriene ointment 1x daily\par -Maintain adequate moisturizer\par -F/U in 6 months or PRN\par \par # Mole in the mid upper back\par - well circumscribed and uniform in color, will continue to monitor for changes\par \par #Cyst in the the right medial wrist\par -Well circumscribed, will continue to monitor for changes\par \par F/U \par - Psoriatic arthritis rash s/p ointment\par -Next appointment in 6 month or PRN

## 2023-01-05 NOTE — ED BEHAVIORAL HEALTH ASSESSMENT NOTE - EMPLOYMENT
Unemployed Cibinqo Pregnancy And Lactation Text: It is unknown if this medication will adversely affect pregnancy or breast feeding.  You should not take this medication if you are currently pregnant or planning a pregnancy or while breastfeeding.

## 2023-03-21 ENCOUNTER — APPOINTMENT (OUTPATIENT)
Dept: DERMATOLOGY | Facility: CLINIC | Age: 53
End: 2023-03-21

## 2023-04-17 ENCOUNTER — NON-APPOINTMENT (OUTPATIENT)
Age: 53
End: 2023-04-17

## 2023-05-27 NOTE — ED ADULT TRIAGE NOTE - PATIENT ON (OXYGEN DELIVERY METHOD)
Occupational Therapy  Co-Evaluation with PT and Discharge Note    Name: Marlyn Camacho  MRN: [id number]  Admitting Diagnosis: Stroke aborted by administration of thrombolytic agent  Recent Surgery: * No surgery found *      Recommendations:     Discharge Recommendations: other (see comments)  Discharge Equipment Recommendations: none  Barriers to discharge:  None    Assessment:     Marlyn Camacho is a 43 y.o. female with a medical diagnosis of Stroke aborted by administration of thrombolytic agent. At this time, patient is functioning at their prior level of function and does not require further acute OT services.     -Co-tx with PT performed due to need for education and assistance from two skilled therapy disciplines at pt's current functional level.       Plan:     During this hospitalization, patient does not require further acute OT services.  Please re-consult if situation changes.    Plan of Care Reviewed with: patient    Subjective     Chief Complaint: LUE weakness (but improving)  Patient/Family Comments/goals: Go home    Occupational Profile:  Living Environment: Pt lives with  and 3 adult children in a house with no JORGE, and has a WIS with BIB.  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  Patient found up in chair with blood pressure cuff, pulse ox (continuous), telemetry upon OT entry to room.    General Precautions: Standard,    Orthopedic Precautions: N/A  Braces: N/A  Respiratory Status: Room air     Occupational Performance:    Bed Mobility:    Not observed    Functional Mobility/Transfers:  Patient completed Sit <>  Stand Transfer with independence  with  no assistive device   Patient completed Toilet Transfer Step Transfer technique with independence with  no AD  Functional Mobility: Independent    Activities of Daily Living:  Grooming: modified independence for slight weakness L hand  Upper Body Dressing: independence    Lower Body Dressing: independence    Toileting: independence      Cognitive/Visual Perceptual:  Cognitive/Psychosocial Skills:     -       Oriented to: Person, Place, Time, and Situation   -       Follows Commands/attention:Follows multistep  commands  -       Safety awareness/insight to disability: intact     Physical Exam:  Dominant hand:    -       R  Upper Extremity Range of Motion:     -       Right Upper Extremity: WFL  -       Left Upper Extremity: WFL  Upper Extremity Strength:    -       Right Upper Extremity: WFL  -       Left Upper Extremity: WFL but decreased from baseline   Strength:    -       Right Upper Extremity: WFL  -       Left Upper Extremity: 3/5  Fine Motor Coordination:    -       Impaired  Left hand, manipulation of objects but still WFL  Gross motor coordination:   WFL    AMPAC 6 Click ADL:  AMPAC Total Score: 24    Treatment & Education:  Pt edu re OT role, POC and safety.    Patient left up in chair with all lines intact and call button in reach    GOALS:   Multidisciplinary Problems       Occupational Therapy Goals       Not on file                    History:     Past Medical History:   Diagnosis Date    Abnormal Pap smear of cervix     Allergy     Hypertension     Joint pain     Keloid cicatrix          Past Surgical History:   Procedure Laterality Date    CKC, ECC, fractional D&C      COLPOSCOPY      CONIZATION-CERVIX  02/2015    DILATION AND CURETTAGE OF UTERUS      HYSTERECTOMY  03/2017    AUB, fibroids    TUBAL LIGATION         Time Tracking:     OT Date of Treatment: 05/27/23  OT Start Time: 0954  OT Stop Time: 1008  OT Total Time (min): 14 min    Billable  Minutes:Evaluation 14 minutes    ARNEL Cano  5/27/2023  Pager: [phone]     room air

## 2023-06-20 ENCOUNTER — APPOINTMENT (OUTPATIENT)
Dept: DERMATOLOGY | Facility: CLINIC | Age: 53
End: 2023-06-20

## 2023-07-01 ENCOUNTER — EMERGENCY (EMERGENCY)
Facility: HOSPITAL | Age: 53
LOS: 0 days | Discharge: HOME | End: 2023-07-02
Attending: EMERGENCY MEDICINE
Payer: MEDICAID

## 2023-07-01 VITALS
DIASTOLIC BLOOD PRESSURE: 95 MMHG | TEMPERATURE: 98 F | WEIGHT: 201.94 LBS | SYSTOLIC BLOOD PRESSURE: 126 MMHG | HEART RATE: 74 BPM | OXYGEN SATURATION: 99 % | RESPIRATION RATE: 18 BRPM

## 2023-07-01 DIAGNOSIS — E04.2 NONTOXIC MULTINODULAR GOITER: ICD-10-CM

## 2023-07-01 DIAGNOSIS — E28.2 POLYCYSTIC OVARIAN SYNDROME: ICD-10-CM

## 2023-07-01 DIAGNOSIS — Z88.2 ALLERGY STATUS TO SULFONAMIDES: ICD-10-CM

## 2023-07-01 DIAGNOSIS — Z88.1 ALLERGY STATUS TO OTHER ANTIBIOTIC AGENTS STATUS: ICD-10-CM

## 2023-07-01 DIAGNOSIS — F43.10 POST-TRAUMATIC STRESS DISORDER, UNSPECIFIED: ICD-10-CM

## 2023-07-01 DIAGNOSIS — R42 DIZZINESS AND GIDDINESS: ICD-10-CM

## 2023-07-01 DIAGNOSIS — Z79.82 LONG TERM (CURRENT) USE OF ASPIRIN: ICD-10-CM

## 2023-07-01 DIAGNOSIS — Z88.3 ALLERGY STATUS TO OTHER ANTI-INFECTIVE AGENTS: ICD-10-CM

## 2023-07-01 DIAGNOSIS — R20.2 PARESTHESIA OF SKIN: ICD-10-CM

## 2023-07-01 DIAGNOSIS — F41.9 ANXIETY DISORDER, UNSPECIFIED: ICD-10-CM

## 2023-07-01 DIAGNOSIS — J45.909 UNSPECIFIED ASTHMA, UNCOMPLICATED: ICD-10-CM

## 2023-07-01 DIAGNOSIS — G47.30 SLEEP APNEA, UNSPECIFIED: ICD-10-CM

## 2023-07-01 LAB
ALBUMIN SERPL ELPH-MCNC: 4.4 G/DL — SIGNIFICANT CHANGE UP (ref 3.5–5.2)
ALP SERPL-CCNC: 111 U/L — SIGNIFICANT CHANGE UP (ref 30–115)
ALT FLD-CCNC: 18 U/L — SIGNIFICANT CHANGE UP (ref 0–41)
ANION GAP SERPL CALC-SCNC: 12 MMOL/L — SIGNIFICANT CHANGE UP (ref 7–14)
APTT BLD: 36.2 SEC — SIGNIFICANT CHANGE UP (ref 27–39.2)
AST SERPL-CCNC: 17 U/L — SIGNIFICANT CHANGE UP (ref 0–41)
BASOPHILS # BLD AUTO: 0.09 K/UL — SIGNIFICANT CHANGE UP (ref 0–0.2)
BASOPHILS NFR BLD AUTO: 1.5 % — HIGH (ref 0–1)
BILIRUB SERPL-MCNC: 0.5 MG/DL — SIGNIFICANT CHANGE UP (ref 0.2–1.2)
BUN SERPL-MCNC: 17 MG/DL — SIGNIFICANT CHANGE UP (ref 10–20)
CALCIUM SERPL-MCNC: 9.8 MG/DL — SIGNIFICANT CHANGE UP (ref 8.4–10.4)
CHLORIDE SERPL-SCNC: 104 MMOL/L — SIGNIFICANT CHANGE UP (ref 98–110)
CO2 SERPL-SCNC: 25 MMOL/L — SIGNIFICANT CHANGE UP (ref 17–32)
CREAT SERPL-MCNC: 0.8 MG/DL — SIGNIFICANT CHANGE UP (ref 0.7–1.5)
EGFR: 88 ML/MIN/1.73M2 — SIGNIFICANT CHANGE UP
EOSINOPHIL # BLD AUTO: 0.5 K/UL — SIGNIFICANT CHANGE UP (ref 0–0.7)
EOSINOPHIL NFR BLD AUTO: 8.5 % — HIGH (ref 0–8)
GLUCOSE SERPL-MCNC: 102 MG/DL — HIGH (ref 70–99)
HCG SERPL QL: NEGATIVE — SIGNIFICANT CHANGE UP
HCT VFR BLD CALC: 41.8 % — SIGNIFICANT CHANGE UP (ref 37–47)
HGB BLD-MCNC: 14.2 G/DL — SIGNIFICANT CHANGE UP (ref 12–16)
IMM GRANULOCYTES NFR BLD AUTO: 0.2 % — SIGNIFICANT CHANGE UP (ref 0.1–0.3)
INR BLD: 0.97 RATIO — SIGNIFICANT CHANGE UP (ref 0.65–1.3)
LYMPHOCYTES # BLD AUTO: 2.14 K/UL — SIGNIFICANT CHANGE UP (ref 1.2–3.4)
LYMPHOCYTES # BLD AUTO: 36.5 % — SIGNIFICANT CHANGE UP (ref 20.5–51.1)
MCHC RBC-ENTMCNC: 30.7 PG — SIGNIFICANT CHANGE UP (ref 27–31)
MCHC RBC-ENTMCNC: 34 G/DL — SIGNIFICANT CHANGE UP (ref 32–37)
MCV RBC AUTO: 90.5 FL — SIGNIFICANT CHANGE UP (ref 81–99)
MONOCYTES # BLD AUTO: 0.43 K/UL — SIGNIFICANT CHANGE UP (ref 0.1–0.6)
MONOCYTES NFR BLD AUTO: 7.3 % — SIGNIFICANT CHANGE UP (ref 1.7–9.3)
NEUTROPHILS # BLD AUTO: 2.69 K/UL — SIGNIFICANT CHANGE UP (ref 1.4–6.5)
NEUTROPHILS NFR BLD AUTO: 46 % — SIGNIFICANT CHANGE UP (ref 42.2–75.2)
NRBC # BLD: 0 /100 WBCS — SIGNIFICANT CHANGE UP (ref 0–0)
PLATELET # BLD AUTO: 238 K/UL — SIGNIFICANT CHANGE UP (ref 130–400)
PMV BLD: 10 FL — SIGNIFICANT CHANGE UP (ref 7.4–10.4)
POTASSIUM SERPL-MCNC: 4.1 MMOL/L — SIGNIFICANT CHANGE UP (ref 3.5–5)
POTASSIUM SERPL-SCNC: 4.1 MMOL/L — SIGNIFICANT CHANGE UP (ref 3.5–5)
PROT SERPL-MCNC: 6.7 G/DL — SIGNIFICANT CHANGE UP (ref 6–8)
PROTHROM AB SERPL-ACNC: 11 SEC — SIGNIFICANT CHANGE UP (ref 9.95–12.87)
RBC # BLD: 4.62 M/UL — SIGNIFICANT CHANGE UP (ref 4.2–5.4)
RBC # FLD: 12.4 % — SIGNIFICANT CHANGE UP (ref 11.5–14.5)
SODIUM SERPL-SCNC: 141 MMOL/L — SIGNIFICANT CHANGE UP (ref 135–146)
TROPONIN T SERPL-MCNC: <0.01 NG/ML — SIGNIFICANT CHANGE UP
WBC # BLD: 5.86 K/UL — SIGNIFICANT CHANGE UP (ref 4.8–10.8)
WBC # FLD AUTO: 5.86 K/UL — SIGNIFICANT CHANGE UP (ref 4.8–10.8)

## 2023-07-01 PROCEDURE — 93010 ELECTROCARDIOGRAM REPORT: CPT

## 2023-07-01 PROCEDURE — 93005 ELECTROCARDIOGRAM TRACING: CPT

## 2023-07-01 PROCEDURE — 70498 CT ANGIOGRAPHY NECK: CPT | Mod: MA

## 2023-07-01 PROCEDURE — 85730 THROMBOPLASTIN TIME PARTIAL: CPT

## 2023-07-01 PROCEDURE — 70496 CT ANGIOGRAPHY HEAD: CPT | Mod: 26,MA

## 2023-07-01 PROCEDURE — 36415 COLL VENOUS BLD VENIPUNCTURE: CPT

## 2023-07-01 PROCEDURE — 80053 COMPREHEN METABOLIC PANEL: CPT

## 2023-07-01 PROCEDURE — 70450 CT HEAD/BRAIN W/O DYE: CPT | Mod: 26,MA

## 2023-07-01 PROCEDURE — 85610 PROTHROMBIN TIME: CPT

## 2023-07-01 PROCEDURE — 70496 CT ANGIOGRAPHY HEAD: CPT | Mod: MA

## 2023-07-01 PROCEDURE — 70498 CT ANGIOGRAPHY NECK: CPT | Mod: 26,MA

## 2023-07-01 PROCEDURE — 70450 CT HEAD/BRAIN W/O DYE: CPT | Mod: MA

## 2023-07-01 PROCEDURE — 99285 EMERGENCY DEPT VISIT HI MDM: CPT | Mod: 25

## 2023-07-01 PROCEDURE — 85025 COMPLETE CBC W/AUTO DIFF WBC: CPT

## 2023-07-01 PROCEDURE — 84703 CHORIONIC GONADOTROPIN ASSAY: CPT

## 2023-07-01 PROCEDURE — 70551 MRI BRAIN STEM W/O DYE: CPT | Mod: 26,MA

## 2023-07-01 PROCEDURE — 99285 EMERGENCY DEPT VISIT HI MDM: CPT

## 2023-07-01 PROCEDURE — 0042T: CPT | Mod: MA

## 2023-07-01 PROCEDURE — 70551 MRI BRAIN STEM W/O DYE: CPT | Mod: MA

## 2023-07-01 PROCEDURE — G0378: CPT

## 2023-07-01 PROCEDURE — 82962 GLUCOSE BLOOD TEST: CPT

## 2023-07-01 PROCEDURE — 84484 ASSAY OF TROPONIN QUANT: CPT

## 2023-07-01 RX ORDER — SODIUM CHLORIDE 9 MG/ML
1000 INJECTION INTRAMUSCULAR; INTRAVENOUS; SUBCUTANEOUS ONCE
Refills: 0 | Status: COMPLETED | OUTPATIENT
Start: 2023-07-01 | End: 2023-07-01

## 2023-07-01 RX ORDER — MECLIZINE HCL 12.5 MG
50 TABLET ORAL ONCE
Refills: 0 | Status: COMPLETED | OUTPATIENT
Start: 2023-07-01 | End: 2023-07-01

## 2023-07-01 RX ORDER — DIPHENHYDRAMINE HCL 50 MG
50 CAPSULE ORAL ONCE
Refills: 0 | Status: DISCONTINUED | OUTPATIENT
Start: 2023-07-01 | End: 2023-07-02

## 2023-07-01 RX ORDER — MECLIZINE HCL 12.5 MG
25 TABLET ORAL ONCE
Refills: 0 | Status: DISCONTINUED | OUTPATIENT
Start: 2023-07-01 | End: 2023-07-01

## 2023-07-01 RX ADMIN — Medication 50 MILLIGRAM(S): at 20:20

## 2023-07-01 RX ADMIN — SODIUM CHLORIDE 1000 MILLILITER(S): 9 INJECTION INTRAMUSCULAR; INTRAVENOUS; SUBCUTANEOUS at 18:39

## 2023-07-01 NOTE — ED PROVIDER NOTE - PHYSICAL EXAMINATION
VITAL SIGNS: I have reviewed nursing notes and confirm.  CONSTITUTIONAL: Well-appearing, non-toxic, in NAD  SKIN: Warm dry, normal skin turgor  HEAD: NCAT  EYES: No conjunctival injection, scleral anicteric  ENT: MMM  NECK: Supple; FROM.   CARD: RRR  RESP: CTAB  ABD: Soft, NDNT  EXT: No pedal edema, no calf tenderness  NEURO: Normal motor strength. No facial asymmety. no pronator drift.   PSYCH: Cooperative, appropriate

## 2023-07-01 NOTE — ED PROVIDER NOTE - CONSIDERATION OF ADMISSION OBSERVATION
Patient placed on OBS status for MRI and repeat neuro exams and further testing. She may require admisison for any change in neuro status or abnormal imaging. Consideration of Admission/Observation

## 2023-07-01 NOTE — CONSULT NOTE ADULT - ASSESSMENT
This is a 53yo F w/ h/o Sleep Apnea, PTSD, Asthma and PCOS presented to the ED for right hand numbness that progressed to dizziness over a few hours. She was a stroke code NIHSS 0, LKN 3am. NCCTH w/ hemorrhage or acute findings. No LVO or stenos-occlusive disease on CTA. Incidentally she was found to have multiple thyroid nodules which could explain her c/o palpitations and dizziness. Although this could be a panic attack consider her anxiety as a family member of her age passed from a stroke.     Impression  Anxiety   Thyroid function abnormality       Plan  Obs for MRI to assess for posterior circulation pathology  Meclizine for vertigo  consult Medicine for thyroid assessment   Get EKG   Get Orthostatic vitals     This is a 51yo F w/ h/o Sleep Apnea, PTSD, Asthma and PCOS presented to the ED for right hand numbness that progressed to dizziness over a few hours. She was a stroke code NIHSS 0, LKN 3am. NCCTH w/ hemorrhage or acute findings. No LVO or stenos-occlusive disease on CTA. Incidentally she was found to have multiple thyroid nodules which could explain her c/o palpitations and dizziness. Although this could anxiety related considering a family member of her age passed from a stroke.     Impression  Anxiety   Thyroid dysfunction    less likely a stroke       Plan  Meclizine PRN for vertigo  Obs for MRI brain non-con to assess for posterior circulation pathology  If MRI Neg then ok to discharge   consult Medicine for thyroid assessment   Get EKG   Get Orthostatic vitals     Call Neurovascular team if symptoms worsening or for nay neurological concern     Case discussed w/ Dr. Billings

## 2023-07-01 NOTE — ED CDU PROVIDER INITIAL DAY NOTE - ATTENDING CONTRIBUTION TO CARE
I personally evaluated patient. I agree with the findings and plan with all documentation on chart except as documented  in my note.    52-year-old female past medical history of PCOS, sleep apnea, PTSD, asthma presents to the emergency department with numbness to her right arm and hand.  Last known well time was overnight around 3 AM.  Patient unsure if she slept on her arm the wrong way and later took aspirin 325 mg x 1 dose.  Patient also reports getting up and feeling dizzy and lightheaded.  No recent fever, chills, recent illness.  Patient no falls or direct trauma to her arm.  Patient denies any trouble speaking or symptoms to her face or leg.    Stroke code was activated in triage given right arm numbness.  Initial vital signs reviewed.  Initial NIH score was 0 as patient has intact sensation and normal motor strength with no pronator drift.  Patient went for emergent head CT and had CT angio and perfusion studies performed.  Patient is not a TNK candidate given time of onset and last known well time.  Patient not interventional candidate as she does not have an LVO.  Head CT shows no acute changes and CTA and perfusion study showed no perfusion mismatch, LVO, aneurysm.      Patient placed on OBS for MRI and further neurological evaluation and reassessments. MRI performed and shows no acute changes. Patient was given fluids and Meclizine with improvement.

## 2023-07-01 NOTE — ED CDU PROVIDER INITIAL DAY NOTE - CLINICAL SUMMARY MEDICAL DECISION MAKING FREE TEXT BOX
52-year-old female past medical history of PCOS, sleep apnea, PTSD, asthma presents to the emergency department with numbness to her right arm and hand.  Last known well time was overnight around 3 AM.  Patient unsure if she slept on her arm the wrong way and later took aspirin 325 mg x 1 dose.  Patient also reports getting up and feeling dizzy and lightheaded.  No recent fever, chills, recent illness.  Patient no falls or direct trauma to her arm.  Patient denies any trouble speaking or symptoms to her face or leg.    Stroke code was activated in triage given right arm numbness.  Initial vital signs reviewed.  Initial NIH score was 0 as patient has intact sensation and normal motor strength with no pronator drift.  Patient went for emergent head CT and had CT angio and perfusion studies performed.  Patient is not a TNK candidate given time of onset and last known well time.  Patient not interventional candidate as she does not have an LVO.  Head CT shows no acute changes and CTA and perfusion study showed no perfusion mismatch, LVO, aneurysm.      Patient placed on OBS for MRI and further neurological evaluation and reassessments. MRI performed and shows no acute changes. Patient was given fluids and Meclizine with improvement.

## 2023-07-01 NOTE — ED PROVIDER NOTE - ATTENDING CONTRIBUTION TO CARE
I personally evaluated patient. I agree with the findings and plan with all documentation on chart except as documented  in my note.    52-year-old female past medical history of PCOS, sleep apnea, PTSD, asthma presents to the emergency department with numbness to her right arm and hand.  Last known well time was overnight around 3 AM.  Patient unsure if she slept on her arm the wrong way and later took aspirin 325 mg x 1 dose.  Patient also reports getting up and feeling dizzy and lightheaded.  No recent fever, chills, recent illness.  Patient no falls or direct trauma to her arm.  Patient denies any trouble speaking or symptoms to her face or leg.    Stroke code was activated in triage given right arm numbness.  Initial vital signs reviewed.  Initial NIH score was 0 as patient has intact sensation and normal motor strength with no pronator drift.  Patient went for emergent head CT and had CT angio and perfusion studies performed.  Patient is not a TNK candidate given time of onset and last known well time.  Patient not interventional candidate as she does not have an LVO.  Head CT shows no acute changes and CTA and perfusion study showed no perfusion mismatch, LVO, aneurysm.  Case discussed with neurology evaluated patient and patient to be placed on ops status for MRI, repeat neurological examinations, further testing.  Patient was given IV fluids and meclizine for dizziness that she was having that was postural and concern for possible vertigo.  We will continue to monitor and reassess.

## 2023-07-01 NOTE — ED CDU PROVIDER INITIAL DAY NOTE - NSICDXPASTMEDICALHX_GEN_ALL_CORE_FT
PAST MEDICAL HISTORY:  Asthma     PCOS (polycystic ovarian syndrome)     Post traumatic stress disorder (PTSD)

## 2023-07-01 NOTE — ED ADULT NURSE NOTE - NSFALLRISKINTERV_ED_ALL_ED

## 2023-07-01 NOTE — ED PROVIDER NOTE - CARE PLAN
1 Principal Discharge DX:	Dizziness   Principal Discharge DX:	Dizziness  Secondary Diagnosis:	Right arm numbness

## 2023-07-01 NOTE — ED PROVIDER NOTE - CLINICAL SUMMARY MEDICAL DECISION MAKING FREE TEXT BOX
52-year-old female past medical history of PCOS, sleep apnea, PTSD, asthma presents to the emergency department with numbness to her right arm and hand.  Last known well time was overnight around 3 AM.  Patient unsure if she slept on her arm the wrong way and later took aspirin 325 mg x 1 dose.  Patient also reports getting up and feeling dizzy and lightheaded.  No recent fever, chills, recent illness.  Patient no falls or direct trauma to her arm.  Patient denies any trouble speaking or symptoms to her face or leg.    Stroke code was activated in triage given right arm numbness.  Initial vital signs reviewed.  Initial NIH score was 0 as patient has intact sensation and normal motor strength with no pronator drift.  Patient went for emergent head CT and had CT angio and perfusion studies performed.  Patient is not a TNK candidate given time of onset and last known well time.  Patient not interventional candidate as she does not have an LVO.  Head CT shows no acute changes and CTA and perfusion study showed no perfusion mismatch, LVO, aneurysm.  Case discussed with neurology evaluated patient and patient to be placed on ops status for MRI, repeat neurological examinations, further testing.  Patient was given IV fluids and meclizine for dizziness that she was having that was postural and concern for possible vertigo.  We will continue to monitor and reassess.

## 2023-07-01 NOTE — CONSULT NOTE ADULT - SUBJECTIVE AND OBJECTIVE BOX
Neurology Consult    Patient is a 52y old  Female who presents with a chief complaint of right hand numbness and dizziness    HPI:  Ms. Lira reported that she wok up multiple times during the early morning starting at around 3am w/ numbness to her right hand in which she tried to shake out. She then notice around 11am after getting up from sitting she felt light headed. She pushed through the feeling walking around but no improvement. She tried yoga which makes her symptoms worst. She research possible etiology for her symptoms and concluded that she had a TIA and took aspirin 325mg chewable. After her symptoms persist she decided to come to the ED as she had a cousin who had  from a massive stroke at her age. She also reported that a few days this week she was experiencing palpitation and was planning to visit the cardiologist as most of her family suffer from CHF.     She denies double vision, falls, recent illness or limb weakness        PAST MEDICAL & SURGICAL HISTORY:  Psoriasis   PCOS  Sleep Apnea (no cpap)  Asthma  PTSD    FAMILY HISTORY:  Stroke in cousin   CHF     Social History:   No tobacco, marijuana, EtOH or illicit drug use     Allergies    Cipro (Hives)  Bactrim (Hives)  Parabines/Lanolines (Rash)    Intolerances        MEDICATIONS  (STANDING):  meclizine 50 milliGRAM(s) Oral Once  sodium chloride 0.9% Bolus 1000 milliLiter(s) IV Bolus once    MEDICATIONS  (PRN):      Review of systems:    Constitutional: as per HPI  Eyes: No eye pain or discharge  ENMT:  No difficulty hearing; No sinus or throat pain  Neck: No pain or stiffness  Respiratory: No cough, wheezing, chills or hemoptysis  Cardiovascular: No chest pain, palpitations, shortness of breath, dyspnea on exertion  Gastrointestinal: No abdominal pain, nausea, vomiting or hematemesis; No diarrhea or constipation.   Genitourinary: No dysuria, frequency, hematuria or incontinence  Neurological: As per HPI  Skin: rashes    Musculoskeletal: No joint pain or swelling  Psychiatric: Anxiety  Heme/Lymph: No easy bruising or bleeding gums    Vital Signs Last 24 Hrs  T(C): 36.7 (2023 16:23), Max: 36.7 (2023 16:23)  T(F): 98.1 (2023 16:23), Max: 98.1 (2023 16:23)  HR: 74 (2023 16:23) (74 - 74)  BP: 126/95 (2023 16:23) (126/95 - 126/95)  RR: 18 (2023 16:23) (18 - 18)  SpO2: 99% (2023 16:23) (99% - 99%); ORA    Examination:  General:  Appearance is consistent with chronologic age.  No abnormal facies.  Gross skin survey within normal limits.    Cognitive/Language:  The patient is oriented to person, place, time and date.  Recent and remote memory intact.  Nondysarthric.    Eyes: intact VA, VFF.  EOMI w/o nystagmus, skew or reported double vision.  PERRL.  No ptosis/weakness of eyelid closure.    Face:  Facial sensation normal V1 - 3, no facial asymmetry.    Ears/Nose/Throat:  Hearing grossly intact b/l.  Palate elevates midline.  Tongue and uvula midline.   Motor examination:   Normal tone, bulk and range of motion.  No tenderness, twitching, tremors or involuntary movements.  Formal Muscle Strength Testing: (MRC grade R/L) 5/5 UE; 5/5 LE.  No observable drift.  Reflexes:   2+ b/l pectoralis, biceps, triceps, brachioradialis, patella and Achilles.  Plantar response downgoing b/l.   clonus absent.  Sensory examination:   Intact to light touch and pinprick, pain, temperature and proprioception and vibration in all extremities.  Cerebellum:   FTN/HKS intact with normal TANIA in all limbs.  No dysmetria or adiadokokinesia  Gait narrow based and normal.    [place] hallpike reproducible dizziness w/o nystagmus     Respiratory:  no audible wheezing or inspiratory stridor.  no use of accessory muscles.   Cardiac: pulse palpable, no audible bruits  Abdomen: supple, no guarding, no TTP    Labs:   CBC Full  -  ( 2023 16:43 )  WBC Count : 5.86 K/uL  RBC Count : 4.62 M/uL  Hemoglobin : 14.2 g/dL  Hematocrit : 41.8 %  Platelet Count - Automated : 238 K/uL  Mean Cell Volume : 90.5 fL  Mean Cell Hemoglobin : 30.7 pg  Mean Cell Hemoglobin Concentration : 34.0 g/dL  Auto Neutrophil # : 2.69 K/uL  Auto Lymphocyte # : 2.14 K/uL  Auto Monocyte # : 0.43 K/uL  Auto Eosinophil # : 0.50 K/uL  Auto Basophil # : 0.09 K/uL  Auto Neutrophil % : 46.0 %  Auto Lymphocyte % : 36.5 %  Auto Monocyte % : 7.3 %  Auto Eosinophil % : 8.5 %  Auto Basophil % : 1.5 %        141  |  104  |  17  ----------------------------<  102<H>  4.1   |  25  |  0.8    Ca    9.8      2023 16:43    TPro  6.7  /  Alb  4.4  /  TBili  0.5  /  DBili  x   /  AST  17  /  ALT  18  /  AlkPhos  111      LIVER FUNCTIONS - ( 2023 16:43 )  Alb: 4.4 g/dL / Pro: 6.7 g/dL / ALK PHOS: 111 U/L / ALT: 18 U/L / AST: 17 U/L / GGT: x           PT/INR - ( 2023 16:43 )   PT: 11.00 sec;   INR: 0.97 ratio    PTT - ( 2023 16:43 )  PTT:36.2 sec      CT Brain Stroke Protocol (23 @ 16:39) >No CT evidence of acute intracranial pathology.      1.  No perfusion deficit to suggest ischemic penumbra.  2.  No large vessel occlusion, vascular malformation, or saccular aneurysm.  3.  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  Bilateral thyroid nodules with the largest measuring 1.8 cm with internal calcifications. A dedicated thyroid ultrasound is recommended for further evaluation on a nonemergent basis.                 Neurology Consult    Patient is a 52y old  Female who presents with a chief complaint of right hand numbness and dizziness    HPI:  Ms. Lira reported that she wok up multiple times during the early morning starting at around 3am w/ numbness to her right hand in which she tried to shake out. She then notice around 11am after getting up from sitting she felt light headed. She pushed through the feeling walking around but no improvement. She tried yoga which makes her symptoms worst. She research possible etiology for her symptoms and concluded that she had a TIA and took aspirin 325mg chewable. After her symptoms persist she decided to come to the ED as she had a cousin who had  from a massive stroke at her age. She also reported that a few days this week she was experiencing palpitation and was planning to visit the cardiologist as most of her family suffer from CHF.     She denies double vision, falls, recent illness or limb weakness        PAST MEDICAL & SURGICAL HISTORY:  Psoriasis   PCOS  Sleep Apnea (no cpap)  Asthma  PTSD    FAMILY HISTORY:  Stroke in cousin   CHF     Social History:   No tobacco, marijuana, EtOH or illicit drug use     Allergies    Cipro (Hives)  Bactrim (Hives)  Parabines/Lanolines (Rash)    Intolerances        MEDICATIONS  (STANDING):  meclizine 50 milliGRAM(s) Oral Once  sodium chloride 0.9% Bolus 1000 milliLiter(s) IV Bolus once    MEDICATIONS  (PRN):      Review of systems:    Constitutional: as per HPI  Eyes: No eye pain or discharge  ENMT:  No difficulty hearing; No sinus or throat pain  Neck: No pain or stiffness  Respiratory: No cough, wheezing, chills or hemoptysis  Cardiovascular: No chest pain, palpitations, shortness of breath, dyspnea on exertion  Gastrointestinal: No abdominal pain, nausea, vomiting or hematemesis; No diarrhea or constipation.   Genitourinary: No dysuria, frequency, hematuria or incontinence  Neurological: As per HPI  Skin: rashes    Musculoskeletal: No joint pain or swelling  Psychiatric: Anxiety  Heme/Lymph: No easy bruising or bleeding gums    Vital Signs Last 24 Hrs  T(C): 36.7 (2023 16:23), Max: 36.7 (2023 16:23)  T(F): 98.1 (2023 16:23), Max: 98.1 (2023 16:23)  HR: 74 (2023 16:23) (74 - 74)  BP: 126/95 (2023 16:23) (126/95 - 126/95)  RR: 18 (2023 16:23) (18 - 18)  SpO2: 99% (2023 16:23) (99% - 99%); ORA    Examination:  General:  Appearance is consistent with chronologic age.  No abnormal facies.  Gross skin survey within normal limits.    Cognitive/Language:  The patient is oriented to person, place, time and date.  Recent and remote memory intact.  Nondysarthric.    Eyes: intact VA, VFF.  EOMI w/o nystagmus, skew or reported double vision.  PERRL.  No ptosis/weakness of eyelid closure.    Face:  Facial sensation normal V1 - 3, no facial asymmetry.    Ears/Nose/Throat:  Hearing grossly intact b/l.  Palate elevates midline.  Tongue and uvula midline.   Motor examination:   Normal tone, bulk and range of motion.  No tenderness, twitching, tremors or involuntary movements.  Formal Muscle Strength Testing: (MRC grade R/L) 5/5 UE; 5/5 LE.  No observable drift.  Reflexes:   2+ b/l pectoralis, biceps, triceps, brachioradialis, patella and Achilles.  Plantar response downgoing b/l.   clonus absent.  Sensory examination:   Intact to light touch and pinprick, pain, temperature and proprioception and vibration in all extremities.  Cerebellum:   FTN/HKS intact with normal TANIA in all limbs.  No dysmetria or adiadokokinesia  Gait narrow based and normal.    [place] hallpike reproducible dizziness w/o nystagmus     Labs:   CBC Full  -  ( 2023 16:43 )  WBC Count : 5.86 K/uL  RBC Count : 4.62 M/uL  Hemoglobin : 14.2 g/dL  Hematocrit : 41.8 %  Platelet Count - Automated : 238 K/uL  Mean Cell Volume : 90.5 fL  Mean Cell Hemoglobin : 30.7 pg  Mean Cell Hemoglobin Concentration : 34.0 g/dL  Auto Neutrophil # : 2.69 K/uL  Auto Lymphocyte # : 2.14 K/uL  Auto Monocyte # : 0.43 K/uL  Auto Eosinophil # : 0.50 K/uL  Auto Basophil # : 0.09 K/uL  Auto Neutrophil % : 46.0 %  Auto Lymphocyte % : 36.5 %  Auto Monocyte % : 7.3 %  Auto Eosinophil % : 8.5 %  Auto Basophil % : 1.5 %        141  |  104  |  17  ----------------------------<  102<H>  4.1   |  25  |  0.8    Ca    9.8      2023 16:43    TPro  6.7  /  Alb  4.4  /  TBili  0.5  /  DBili  x   /  AST  17  /  ALT  18  /  AlkPhos  111      LIVER FUNCTIONS - ( 2023 16:43 )  Alb: 4.4 g/dL / Pro: 6.7 g/dL / ALK PHOS: 111 U/L / ALT: 18 U/L / AST: 17 U/L / GGT: x           PT/INR - ( 2023 16:43 )   PT: 11.00 sec;   INR: 0.97 ratio    PTT - ( 2023 16:43 )  PTT:36.2 sec      CT Brain Stroke Protocol (23 @ 16:39) >No CT evidence of acute intracranial pathology.      1.  No perfusion deficit to suggest ischemic penumbra.  2.  No large vessel occlusion, vascular malformation, or saccular aneurysm.  3.  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  Bilateral thyroid nodules with the largest measuring 1.8 cm with internal calcifications. A dedicated thyroid ultrasound is recommended for further evaluation on a nonemergent basis.

## 2023-07-01 NOTE — ED PROVIDER NOTE - OBJECTIVE STATEMENT
53yo female PMHx asthma presenting with numbness to her right hand that began approximately 3AM today after waking up. She then began developing dizziness described as "unsteadiness causing me to have to hold on to something while I did yoga poses". No associated vomiting, no recent viral illnesses or f/c, no other complaints at this time. Pt took ASA prior to arrival out of concern she could be having a TIA.

## 2023-07-02 VITALS
DIASTOLIC BLOOD PRESSURE: 59 MMHG | SYSTOLIC BLOOD PRESSURE: 119 MMHG | RESPIRATION RATE: 18 BRPM | OXYGEN SATURATION: 97 % | HEART RATE: 69 BPM

## 2023-07-02 PROCEDURE — 99236 HOSP IP/OBS SAME DATE HI 85: CPT

## 2023-07-02 NOTE — ED CDU PROVIDER DISPOSITION NOTE - PROVIDER TOKENS
FREE:[LAST:[follow up with your primary doctor],PHONE:[(   )    -],FAX:[(   )    -],FOLLOWUP:[4-6 Days]],PROVIDER:[TOKEN:[[id number]],FOLLOWUP:[4-6 Days]],PROVIDER:[TOKEN:[[id number]],FOLLOWUP:[4-6 Days]]

## 2023-07-02 NOTE — ED CDU PROVIDER DISPOSITION NOTE - PATIENT PORTAL LINK FT
You can access the FollowMyHealth Patient Portal offered by [facility] by registering at the following website: [email or web address]. By joining [employer]’s FollowMyHealth portal, you will also be able to view your health information using other applications (apps) compatible with our system.

## 2023-07-02 NOTE — ED CDU PROVIDER DISPOSITION NOTE - NSFOLLOWUPCLINICS_GEN_ALL_ED_FT
[facility]  Neurology  [address] [place] [address], [facility] [address]  [place] [address]  Phone: [phone]  Fax:   Follow Up Time: 4-6 Days

## 2023-07-02 NOTE — ED CDU PROVIDER DISPOSITION NOTE - CARE PROVIDER_API CALL
follow up with your primary doctor,   Phone: (   )    -  Fax: (   )    -  Follow Up Time: 4-6 Days    Jasbir Billings  Neurology  [address] [place] [address], [facility] [address]  [place], [place] [id number]  Phone: [phone]  Fax: [phone]  Follow Up Time: 4-6 Days    Cory Ferrera  Otolaryngology  [address] [place] [id number]  Phone: [phone]  Fax: [phone]  Follow Up Time: 4-6 Days

## 2023-07-02 NOTE — ED CDU PROVIDER SUBSEQUENT DAY NOTE - CLINICAL SUMMARY MEDICAL DECISION MAKING FREE TEXT BOX
52-year-old female presents to the emergency department for dizziness.  Feeling little better just in the emergency department had screening exam, labs and imaging, stroke code activation.  No acute emergent findings on stroke work-up, plan was disposition to ED observation unit for continued TIA management.  In the observation unit had additional monitoring, observation, reevaluations, and brain imaging.

## 2023-07-02 NOTE — ED CDU PROVIDER DISPOSITION NOTE - CLINICAL COURSE
52-year-old female presents to the emergency department for dizziness.  Feeling little better just in the emergency department had screening exam, labs and imaging, stroke code activation.  No acute emergent findings on stroke work-up, plan was disposition to ED observation unit for continued TIA management.  In the observation unit had additional monitoring, observation, reevaluations, and brain imaging.  There were no acute emergent findings on brain imaging, neurology consultation completed, plan is for outpatient management.  Plan discussed with patient bedside with explanation of medical decision making with fluid understanding.  At the time of disposition, results of work-up discussed with patient with questions answered, expressed understanding of the medical decision making.

## 2023-07-02 NOTE — ED CDU PROVIDER SUBSEQUENT DAY NOTE - ATTENDING APP SHARED VISIT CONTRIBUTION OF CARE
I personally evaluated the patient. I reviewed the Resident’s or Physician Assistant’s note (as assigned above), and agree with the findings and plan except as documented in my note.     52-year-old female presents to the emergency department for dizziness.  Feeling little better just in the emergency department had screening exam, labs and imaging, stroke code activation.  No acute emergent findings on stroke work-up, plan was disposition to ED observation unit for continued TIA management.  In the observation unit had additional monitoring, observation, reevaluations, and brain imaging.

## 2023-07-02 NOTE — ED CDU PROVIDER SUBSEQUENT DAY NOTE - PROGRESS NOTE DETAILS
patient resting, states she feels improved from arrival. MRI performed, waiting for results. discussed incidental thyroid nodules and need for outpatient workup

## 2023-07-02 NOTE — ED CDU PROVIDER DISPOSITION NOTE - NSFOLLOWUPINSTRUCTIONS_ED_ALL_ED_FT
Dizziness    Dizziness can manifest as a feeling of unsteadiness or light-headedness. You may feel like you are about to faint. This condition can be caused by a number of things, including medicines, dehydration, or illness. Drink enough fluid to keep your urine clear or pale yellow. Do not drink alcohol and limit your caffeine intake. Avoid quick or sudden movements.  Rise slowly from chairs and steady yourself until you feel okay. In the morning, first sit up on the side of the bed.    SEEK IMMEDIATE MEDICAL CARE IF YOU HAVE ANY OF THE FOLLOWING SYMPTOMS: vomiting, changes in your vision or speech, weakness in your arms or legs, trouble speaking or swallowing, chest pain, abdominal pain, shortness of breath, sweating, bleeding, headache, neck pain, or fever.    Vertigo  Vertigo is the feeling that you or your surroundings are moving when they are not. This feeling can come and go at any time. Vertigo often goes away on its own. Vertigo can be dangerous if it occurs while you are doing something that could endanger yourself or others, such as driving or operating machinery.    Your health care provider will do tests to try to determine the cause of your vertigo. Tests will also help your health care provider decide how best to treat your condition.    Follow these instructions at home:  Eating and drinking    A comparison of three sample cups showing dark yellow, yellow, and pale yellow urine.  A sign showing that a person should not drink beer, wine, or liquor.  Dehydration can make vertigo worse. Drink enough fluid to keep your urine pale yellow.  Do not drink alcohol.  Activity    Return to your normal activities as told by your health care provider. Ask your health care provider what activities are safe for you.  In the morning, first sit up on the side of the bed. When you feel okay, stand slowly while you hold onto something until you know that your balance is fine.  Move slowly. Avoid sudden body or head movements or certain positions, as told by your health care provider.  If you have trouble walking or keeping your balance, try using a cane for stability. If you feel dizzy or unstable, sit down right away.  Avoid doing any tasks that would cause danger to you or others if vertigo occurs.  Avoid bending down if you feel dizzy. Place items in your home so that they are easy for you to reach without bending or leaning over.  Do not drive or use machinery if you feel dizzy.  General instructions    Take over-the-counter and prescription medicines only as told by your health care provider.  Keep all follow-up visits. This is important.  Contact a health care provider if:  Your medicines do not relieve your vertigo or they make it worse.  Your condition gets worse or you develop new symptoms.  You have a fever.  You develop nausea or vomiting, or if nausea gets worse.  Your family or friends notice any behavioral changes.  You have numbness or a prickling and tingling sensation in part of your body.  Get help right away if you:  Are always dizzy or you faint.  Develop severe headaches.  Develop a stiff neck.  Develop sensitivity to light.  Have difficulty moving or speaking.  Have weakness in your hands, arms, or legs.  Have changes in your hearing or vision.  These symptoms may represent a serious problem that is an emergency. Do not wait to see if the symptoms will go away. Get medical help right away. Call your local emergency services (911 in the [place] Do not drive yourself to the hospital.    Summary  Vertigo is the feeling that you or your surroundings are moving when they are not.  Your health care provider will do tests to try to determine the cause of your vertigo.  Follow instructions for home care. You may be told to avoid certain tasks, positions, or movements.  Contact a health care provider if your medicines do not relieve your symptoms, or if you have a fever, nausea, vomiting, or changes in behavior.  Get help right away if you have severe headaches or difficulty speaking, or you develop hearing or vision problems.  This information is not intended to replace advice given to you by your health care provider. Make sure you discuss any questions you have with your health care provider.      Our Emergency Department Referral Coordinators will be reaching out to you in the next 24-48 hours from 9:00am to 5:00pm with a follow up appointment. Please expect a phone call from the hospital in that time frame. If you do not receive a call or if you have any questions or concerns, you can reach them at   [phone]    Follow up with your primary doctor for nonemergent ultrasound of your thyroid

## 2023-07-03 PROBLEM — F43.10 POST-TRAUMATIC STRESS DISORDER, UNSPECIFIED: Chronic | Status: ACTIVE | Noted: 2023-07-01

## 2023-07-03 PROBLEM — J45.909 UNSPECIFIED ASTHMA, UNCOMPLICATED: Chronic | Status: ACTIVE | Noted: 2023-07-01

## 2023-07-03 PROBLEM — E28.2 POLYCYSTIC OVARIAN SYNDROME: Chronic | Status: ACTIVE | Noted: 2023-07-01

## 2023-07-05 LAB — GLUCOSE BLDC GLUCOMTR-MCNC: 109 MG/DL — HIGH (ref 70–99)

## 2023-08-07 ENCOUNTER — NON-APPOINTMENT (OUTPATIENT)
Age: 53
End: 2023-08-07

## 2023-08-09 ENCOUNTER — APPOINTMENT (OUTPATIENT)
Dept: INTERNAL MEDICINE | Facility: CLINIC | Age: 53
End: 2023-08-09

## 2023-09-19 ENCOUNTER — APPOINTMENT (OUTPATIENT)
Dept: OTOLARYNGOLOGY | Facility: CLINIC | Age: 53
End: 2023-09-19
Payer: MEDICAID

## 2023-09-19 VITALS — BODY MASS INDEX: 32.14 KG/M2 | WEIGHT: 200 LBS | HEIGHT: 66 IN

## 2023-09-19 DIAGNOSIS — G47.30 SLEEP APNEA, UNSPECIFIED: ICD-10-CM

## 2023-09-19 DIAGNOSIS — E04.1 NONTOXIC SINGLE THYROID NODULE: ICD-10-CM

## 2023-09-19 PROCEDURE — 99204 OFFICE O/P NEW MOD 45 MIN: CPT

## 2023-11-22 ENCOUNTER — APPOINTMENT (OUTPATIENT)
Dept: INTERNAL MEDICINE | Facility: CLINIC | Age: 53
End: 2023-11-22

## 2024-01-03 ENCOUNTER — NON-APPOINTMENT (OUTPATIENT)
Age: 54
End: 2024-01-03

## 2024-02-04 ENCOUNTER — EMERGENCY (EMERGENCY)
Facility: HOSPITAL | Age: 54
LOS: 0 days | Discharge: HOME | End: 2024-02-05
Attending: EMERGENCY MEDICINE
Payer: MEDICAID

## 2024-02-04 VITALS
WEIGHT: 199.96 LBS | DIASTOLIC BLOOD PRESSURE: 96 MMHG | HEIGHT: 66 IN | OXYGEN SATURATION: 98 % | SYSTOLIC BLOOD PRESSURE: 128 MMHG | HEART RATE: 84 BPM | RESPIRATION RATE: 20 BRPM | TEMPERATURE: 99 F

## 2024-02-04 DIAGNOSIS — Z82.49 FAMILY HISTORY OF ISCHEMIC HEART DISEASE AND OTHER DISEASES OF THE CIRCULATORY SYSTEM: ICD-10-CM

## 2024-02-04 DIAGNOSIS — R07.2 PRECORDIAL PAIN: ICD-10-CM

## 2024-02-04 DIAGNOSIS — J45.909 UNSPECIFIED ASTHMA, UNCOMPLICATED: ICD-10-CM

## 2024-02-04 DIAGNOSIS — E28.2 POLYCYSTIC OVARIAN SYNDROME: ICD-10-CM

## 2024-02-04 DIAGNOSIS — Z88.3 ALLERGY STATUS TO OTHER ANTI-INFECTIVE AGENTS: ICD-10-CM

## 2024-02-04 DIAGNOSIS — Z88.1 ALLERGY STATUS TO OTHER ANTIBIOTIC AGENTS STATUS: ICD-10-CM

## 2024-02-04 DIAGNOSIS — J20.9 ACUTE BRONCHITIS, UNSPECIFIED: ICD-10-CM

## 2024-02-04 DIAGNOSIS — Z88.2 ALLERGY STATUS TO SULFONAMIDES: ICD-10-CM

## 2024-02-04 DIAGNOSIS — F43.10 POST-TRAUMATIC STRESS DISORDER, UNSPECIFIED: ICD-10-CM

## 2024-02-04 DIAGNOSIS — G47.33 OBSTRUCTIVE SLEEP APNEA (ADULT) (PEDIATRIC): ICD-10-CM

## 2024-02-04 DIAGNOSIS — R11.0 NAUSEA: ICD-10-CM

## 2024-02-04 DIAGNOSIS — E04.1 NONTOXIC SINGLE THYROID NODULE: ICD-10-CM

## 2024-02-04 LAB
ALBUMIN SERPL ELPH-MCNC: 4.6 G/DL — SIGNIFICANT CHANGE UP (ref 3.5–5.2)
ALP SERPL-CCNC: 114 U/L — SIGNIFICANT CHANGE UP (ref 30–115)
ALT FLD-CCNC: 24 U/L — SIGNIFICANT CHANGE UP (ref 0–41)
ANION GAP SERPL CALC-SCNC: 10 MMOL/L — SIGNIFICANT CHANGE UP (ref 7–14)
AST SERPL-CCNC: 22 U/L — SIGNIFICANT CHANGE UP (ref 0–41)
BASOPHILS # BLD AUTO: 0.11 K/UL — SIGNIFICANT CHANGE UP (ref 0–0.2)
BASOPHILS NFR BLD AUTO: 1.3 % — HIGH (ref 0–1)
BILIRUB SERPL-MCNC: <0.2 MG/DL — SIGNIFICANT CHANGE UP (ref 0.2–1.2)
BUN SERPL-MCNC: 24 MG/DL — HIGH (ref 10–20)
CALCIUM SERPL-MCNC: 10 MG/DL — SIGNIFICANT CHANGE UP (ref 8.4–10.4)
CHLORIDE SERPL-SCNC: 104 MMOL/L — SIGNIFICANT CHANGE UP (ref 98–110)
CO2 SERPL-SCNC: 25 MMOL/L — SIGNIFICANT CHANGE UP (ref 17–32)
CREAT SERPL-MCNC: 0.8 MG/DL — SIGNIFICANT CHANGE UP (ref 0.7–1.5)
EGFR: 87 ML/MIN/1.73M2 — SIGNIFICANT CHANGE UP
EOSINOPHIL # BLD AUTO: 0.54 K/UL — SIGNIFICANT CHANGE UP (ref 0–0.7)
EOSINOPHIL NFR BLD AUTO: 6.6 % — SIGNIFICANT CHANGE UP (ref 0–8)
GLUCOSE SERPL-MCNC: 94 MG/DL — SIGNIFICANT CHANGE UP (ref 70–99)
HCT VFR BLD CALC: 43.9 % — SIGNIFICANT CHANGE UP (ref 37–47)
HGB BLD-MCNC: 14.6 G/DL — SIGNIFICANT CHANGE UP (ref 12–16)
IMM GRANULOCYTES NFR BLD AUTO: 0.4 % — HIGH (ref 0.1–0.3)
LYMPHOCYTES # BLD AUTO: 2.74 K/UL — SIGNIFICANT CHANGE UP (ref 1.2–3.4)
LYMPHOCYTES # BLD AUTO: 33.6 % — SIGNIFICANT CHANGE UP (ref 20.5–51.1)
MCHC RBC-ENTMCNC: 30.9 PG — SIGNIFICANT CHANGE UP (ref 27–31)
MCHC RBC-ENTMCNC: 33.3 G/DL — SIGNIFICANT CHANGE UP (ref 32–37)
MCV RBC AUTO: 92.8 FL — SIGNIFICANT CHANGE UP (ref 81–99)
MONOCYTES # BLD AUTO: 0.4 K/UL — SIGNIFICANT CHANGE UP (ref 0.1–0.6)
MONOCYTES NFR BLD AUTO: 4.9 % — SIGNIFICANT CHANGE UP (ref 1.7–9.3)
NEUTROPHILS # BLD AUTO: 4.34 K/UL — SIGNIFICANT CHANGE UP (ref 1.4–6.5)
NEUTROPHILS NFR BLD AUTO: 53.2 % — SIGNIFICANT CHANGE UP (ref 42.2–75.2)
NRBC # BLD: 0 /100 WBCS — SIGNIFICANT CHANGE UP (ref 0–0)
PLATELET # BLD AUTO: 260 K/UL — SIGNIFICANT CHANGE UP (ref 130–400)
PMV BLD: 9.8 FL — SIGNIFICANT CHANGE UP (ref 7.4–10.4)
POTASSIUM SERPL-MCNC: 5.4 MMOL/L — HIGH (ref 3.5–5)
POTASSIUM SERPL-SCNC: 5.4 MMOL/L — HIGH (ref 3.5–5)
PROT SERPL-MCNC: 7 G/DL — SIGNIFICANT CHANGE UP (ref 6–8)
RBC # BLD: 4.73 M/UL — SIGNIFICANT CHANGE UP (ref 4.2–5.4)
RBC # FLD: 12.6 % — SIGNIFICANT CHANGE UP (ref 11.5–14.5)
SODIUM SERPL-SCNC: 139 MMOL/L — SIGNIFICANT CHANGE UP (ref 135–146)
TROPONIN T, HIGH SENSITIVITY RESULT: 10 NG/L — SIGNIFICANT CHANGE UP (ref 6–13)
TROPONIN T, HIGH SENSITIVITY RESULT: 7 NG/L — SIGNIFICANT CHANGE UP (ref 6–13)
WBC # BLD: 8.16 K/UL — SIGNIFICANT CHANGE UP (ref 4.8–10.8)
WBC # FLD AUTO: 8.16 K/UL — SIGNIFICANT CHANGE UP (ref 4.8–10.8)

## 2024-02-04 PROCEDURE — 80053 COMPREHEN METABOLIC PANEL: CPT

## 2024-02-04 PROCEDURE — 84484 ASSAY OF TROPONIN QUANT: CPT

## 2024-02-04 PROCEDURE — 93308 TTE F-UP OR LMTD: CPT

## 2024-02-04 PROCEDURE — 96374 THER/PROPH/DIAG INJ IV PUSH: CPT | Mod: XU

## 2024-02-04 PROCEDURE — 85025 COMPLETE CBC W/AUTO DIFF WBC: CPT

## 2024-02-04 PROCEDURE — 36415 COLL VENOUS BLD VENIPUNCTURE: CPT

## 2024-02-04 PROCEDURE — 94640 AIRWAY INHALATION TREATMENT: CPT

## 2024-02-04 PROCEDURE — 96375 TX/PRO/DX INJ NEW DRUG ADDON: CPT | Mod: XU

## 2024-02-04 PROCEDURE — 93010 ELECTROCARDIOGRAM REPORT: CPT

## 2024-02-04 PROCEDURE — 71045 X-RAY EXAM CHEST 1 VIEW: CPT

## 2024-02-04 PROCEDURE — 93005 ELECTROCARDIOGRAM TRACING: CPT

## 2024-02-04 PROCEDURE — G0378: CPT

## 2024-02-04 PROCEDURE — 99223 1ST HOSP IP/OBS HIGH 75: CPT

## 2024-02-04 PROCEDURE — 75574 CT ANGIO HRT W/3D IMAGE: CPT | Mod: MA

## 2024-02-04 PROCEDURE — 99285 EMERGENCY DEPT VISIT HI MDM: CPT | Mod: 25

## 2024-02-04 PROCEDURE — 93308 TTE F-UP OR LMTD: CPT | Mod: 26

## 2024-02-04 PROCEDURE — 71045 X-RAY EXAM CHEST 1 VIEW: CPT | Mod: 26

## 2024-02-04 RX ORDER — SODIUM CHLORIDE 9 MG/ML
1000 INJECTION INTRAMUSCULAR; INTRAVENOUS; SUBCUTANEOUS ONCE
Refills: 0 | Status: COMPLETED | OUTPATIENT
Start: 2024-02-04 | End: 2024-02-04

## 2024-02-04 RX ORDER — FAMOTIDINE 10 MG/ML
20 INJECTION INTRAVENOUS ONCE
Refills: 0 | Status: COMPLETED | OUTPATIENT
Start: 2024-02-04 | End: 2024-02-04

## 2024-02-04 RX ORDER — DIPHENHYDRAMINE HCL 50 MG
50 CAPSULE ORAL ONCE
Refills: 0 | Status: COMPLETED | OUTPATIENT
Start: 2024-02-04 | End: 2024-02-05

## 2024-02-04 RX ORDER — ONDANSETRON 8 MG/1
4 TABLET, FILM COATED ORAL ONCE
Refills: 0 | Status: COMPLETED | OUTPATIENT
Start: 2024-02-04 | End: 2024-02-04

## 2024-02-04 RX ADMIN — ONDANSETRON 4 MILLIGRAM(S): 8 TABLET, FILM COATED ORAL at 18:20

## 2024-02-04 RX ADMIN — FAMOTIDINE 20 MILLIGRAM(S): 10 INJECTION INTRAVENOUS at 18:21

## 2024-02-04 RX ADMIN — SODIUM CHLORIDE 1000 MILLILITER(S): 9 INJECTION INTRAMUSCULAR; INTRAVENOUS; SUBCUTANEOUS at 18:20

## 2024-02-04 RX ADMIN — SODIUM CHLORIDE 1000 MILLILITER(S): 9 INJECTION INTRAMUSCULAR; INTRAVENOUS; SUBCUTANEOUS at 18:45

## 2024-02-04 NOTE — ED ADULT NURSE NOTE - OBJECTIVE STATEMENT
53yr old female, presenting to ED c/o chest pain. Pt c/o chest pain and intermittent SOB today after walking up a hill. Denies n/v/d/fevers/chills. Pt A&Ox4, ambulatory.     Pt on Tele/O2 monitor.

## 2024-02-04 NOTE — ED CDU PROVIDER INITIAL DAY NOTE - CLINICAL SUMMARY MEDICAL DECISION MAKING FREE TEXT BOX
Labs and EKG were ordered and reviewed.  Imaging was ordered and reviewed by me.  Appropriate medications for patient's presenting complaints were ordered and effects were reassessed.  Patient's records (prior hospital, ED visit, and/or nursing home notes if available) were reviewed.  Additional history was obtained from EMS, family, and/or PCP (where available).  Placed in obs for complete cardiac workup.

## 2024-02-04 NOTE — ED PROVIDER NOTE - OBJECTIVE STATEMENT
Patient is a 53-year-old female past medical history of PCOS, asthma, thyroid nodules, PTSD, HSV 1 presenting for evaluation of midsternal gripping chest pain that began at 2:30 PM while she was walking up the hill with her dog today.  Patient states that she usually walks this path and had never had symptoms like this before.  Patient states that when chest pain began she had nausea but denies any vomiting.  Patient notes that she has had bronchitis since 12/25/2023.  No cough at this time.  She denies any fevers, chills, sick contacts, recent travel, prolonged travel, leg swelling

## 2024-02-04 NOTE — ED PROVIDER NOTE - WR ORDER DATE AND TIME 1
Pt done with shower. Placed on monitor x2.  Complete hx reviewed.  IV start and labs drawn.  LR bolus infusing.  Labs collected per phleb.     04-Feb-2024 17:08

## 2024-02-04 NOTE — ED ADULT NURSE NOTE - NS ED NOTE ABUSE RESPONSE YN
[FreeTextEntry1] : Hardeep Rodriguez presented to the office today for a cardiovascular evaluation. I saw him in 2016, and we had a virtual visit in May.\par \par He is now 38 years old, with a history of reflux. Over a period of time, he reported having experienced intermittent palpitations. He presented to the hospital in 2015 with atrial fibrillation. He did not convert back to sinus following ibutilide administration. I treated him with aspirin and metoprolol succinate 25 mg daily. I discharged him, expecting him to convert spontaneously back to sinus rhythm within 12 hours. As it turns out, about an hour after discharge from the hospital, he noted that he was back in sinus rhythm.\par \par He saw me in the office in followup 2 weeks later, and was doing well.\par \par He presented to the office virtually in May, believing that he was in AF occasionally.  I felt that his symptoms were more likely from frequent premature beats.  I recommended an echo and a Holter.  He never did the Holter.  The echo was normal.\par \par \par He's been feeling much better. His palpitations have seemingly nearly resolved. He does not report other symptoms suggestive of angina, heart failure or arrhythmia. Along with his palpitations he may experience a sense of "tightness ". Yes

## 2024-02-04 NOTE — ED ADULT NURSE NOTE - CHIEF COMPLAINT QUOTE
pt c/o chest pressure/tightness that occurred when walking up hill with dog. pt also c/o sob, light headed, nausea. pt was sick 2 weeks ago with flu-like symptoms.

## 2024-02-04 NOTE — ED ADULT TRIAGE NOTE - CHIEF COMPLAINT QUOTE
pt c/o chest pressure/tightness that occurred when walking up hill with dog. pt also c/o light headed, nausea. pt was sick 2 weeks ago with flu-like symptoms. pt c/o chest pressure/tightness that occurred when walking up hill with dog. pt also c/o sob, light headed, nausea. pt was sick 2 weeks ago with flu-like symptoms.

## 2024-02-04 NOTE — ED PROVIDER NOTE - ATTENDING CONTRIBUTION TO CARE
I personally evaluated the patient. I reviewed the Resident’s or Physician Assistant’s note (as assigned above), and agree with the findings and plan except as documented in my note.  53-year-old female history of TIERNEY in the past used to use CPAP but then stopped using it presenting here complaining of an episode of chest pain that began while walking up a hill.  Patient is a  and patient was walking and suddenly had 3 episodes of chest pain which she had at home with exertion.  Patient does note that since the holidays she gained weight does not smoke tobacco does smoke marijuana has a family history of early cardiac disease symptoms today were associated with nausea no vomiting no abdominal pain   CONSTITUTIONAL: WA / WN / NAD  HEAD: NCAT  EYES: PERRL; EOMI;   ENT: Normal pharynx; mucous membranes pink/moist, no erythema.  NECK: Supple; no meningeal signs  CARD: RRR; nl S1/S2; no M/R/G.   RESP: Respiratory rate and effort are normal; breath sounds clear and equal bilaterally.  ABD: Soft, NT ND  MSK/EXT: No gross deformities; full range of motion.  SKIN: Warm and dry;   NEURO: AAOx3,  PSYCH: Memory Intact, Normal Affect

## 2024-02-04 NOTE — ED PROVIDER NOTE - PROGRESS NOTE DETAILS
Authored by Dr. James Stockton s/o provide to dr. melara pending repeat troponin and reassessment. pk: labs and imaging reviewed, will place in obs for ccta in the am. pt would like benadryl to help her sleep this evening

## 2024-02-04 NOTE — ED ADULT NURSE NOTE - NSFALLUNIVINTERV_ED_ALL_ED
Bed/Stretcher in lowest position, wheels locked, appropriate side rails in place/Call bell, personal items and telephone in reach/Instruct patient to call for assistance before getting out of bed/chair/stretcher/Non-slip footwear applied when patient is off stretcher/[place] to call system/Physically safe environment - no spills, clutter or unnecessary equipment/Purposeful proactive rounding/Room/bathroom lighting operational, light cord in reach

## 2024-02-04 NOTE — ED PROVIDER NOTE - CLINICAL SUMMARY MEDICAL DECISION MAKING FREE TEXT BOX
Received sign out on patient.  Labs and EKG were ordered and reviewed.  Imaging was ordered and reviewed by me.  Appropriate medications for patient's presenting complaints were ordered and effects were reassessed.  Patient's records (prior hospital, ED visit, and/or nursing home notes if available) were reviewed.  Additional history was obtained from EMS, family, and/or PCP (where available).  Escalation to admission/observation was considered.  Patient placed in obs for complete cardiac workup.  Exertional chest pain with no prior cardiac provocative testing.

## 2024-02-05 VITALS
HEART RATE: 73 BPM | TEMPERATURE: 98 F | OXYGEN SATURATION: 97 % | SYSTOLIC BLOOD PRESSURE: 117 MMHG | DIASTOLIC BLOOD PRESSURE: 71 MMHG | RESPIRATION RATE: 18 BRPM

## 2024-02-05 PROCEDURE — 75574 CT ANGIO HRT W/3D IMAGE: CPT | Mod: 26,MA

## 2024-02-05 PROCEDURE — 93010 ELECTROCARDIOGRAM REPORT: CPT

## 2024-02-05 PROCEDURE — 99239 HOSP IP/OBS DSCHRG MGMT >30: CPT

## 2024-02-05 RX ORDER — BUDESONIDE AND FORMOTEROL FUMARATE DIHYDRATE 160; 4.5 UG/1; UG/1
2 AEROSOL RESPIRATORY (INHALATION)
Refills: 0 | Status: DISCONTINUED | OUTPATIENT
Start: 2024-02-05 | End: 2024-02-05

## 2024-02-05 RX ORDER — METOPROLOL TARTRATE 50 MG
50 TABLET ORAL ONCE
Refills: 0 | Status: DISCONTINUED | OUTPATIENT
Start: 2024-02-05 | End: 2024-02-05

## 2024-02-05 RX ORDER — METOPROLOL TARTRATE 50 MG
50 TABLET ORAL ONCE
Refills: 0 | Status: COMPLETED | OUTPATIENT
Start: 2024-02-05 | End: 2024-02-05

## 2024-02-05 RX ORDER — ALBUTEROL 90 UG/1
1 AEROSOL, METERED ORAL ONCE
Refills: 0 | Status: COMPLETED | OUTPATIENT
Start: 2024-02-05 | End: 2024-02-05

## 2024-02-05 RX ADMIN — Medication 50 MILLIGRAM(S): at 06:34

## 2024-02-05 RX ADMIN — ALBUTEROL 1 PUFF(S): 90 AEROSOL, METERED ORAL at 08:45

## 2024-02-05 RX ADMIN — Medication 50 MILLIGRAM(S): at 00:08

## 2024-02-05 NOTE — ED CDU PROVIDER SUBSEQUENT DAY NOTE - PROGRESS NOTE DETAILS
pt resting comfortably in no acute distress Patient comfortable, awaiting ccta CCTA results noted, will dc home

## 2024-02-05 NOTE — ED CDU PROVIDER DISPOSITION NOTE - CARE PROVIDERS DIRECT ADDRESSES
,DirectAddress_Unknown,[email or web address]

## 2024-02-05 NOTE — ED CDU PROVIDER DISPOSITION NOTE - PROVIDER TOKENS
FREE:[LAST:[your PMD],PHONE:[(   )    -],FAX:[(   )    -],FOLLOWUP:[1-3 Days]],PROVIDER:[TOKEN:[[id number]],FOLLOWUP:[1-3 Days]],PROVIDER:[TOKEN:[[id number]],FOLLOWUP:[1-3 Days]]

## 2024-02-05 NOTE — ED CDU PROVIDER DISPOSITION NOTE - PATIENT PORTAL LINK FT
You can access the FollowMyHealth Patient Portal offered by [facility] by registering at the following website: [email or web address]. By joining [facility]’s FollowMyHealth portal, you will also be able to view your health information using other applications (apps) compatible with our system.

## 2024-02-05 NOTE — ED CDU PROVIDER SUBSEQUENT DAY NOTE - CLINICAL SUMMARY MEDICAL DECISION MAKING FREE TEXT BOX
Labs and EKG were ordered and reviewed.  Imaging was ordered and reviewed by me.  Appropriate medications for patient's presenting complaints were ordered and effects were reassessed.  Patient's records (prior hospital, ED visit, and/or nursing home notes if available) were reviewed.  Additional history was obtained from EMS, family, and/or PCP (where available).  Patient monitored in obs for complete cardiac workup.

## 2024-02-05 NOTE — ED CDU PROVIDER DISPOSITION NOTE - CARE PROVIDER_API CALL
your PMD,   Phone: (   )    -  Fax: (   )    -  Follow Up Time: 1-3 Days    Selma Clement  Cardiovascular Disease  [address] [place] [address], [address]  [place] [address]  Phone: [phone]  Fax: [phone]  Follow Up Time: 1-3 Days    Aristeo Rios  Pulmonary Disease  [address] [place] [id number]  Phone: [phone]  Fax: [phone]  Follow Up Time: 1-3 Days

## 2024-02-05 NOTE — ED CDU PROVIDER DISPOSITION NOTE - CLINICAL COURSE
Patient presents with chest tightness sensation. labs, ekg, cxr done. troponin stable. Patient placed in obs for cardiac evaluation. CCTA done with a score of 0. Results discussed. patient requesting cardiologist and pulmonologist, referrals given. Return precautions discussed.

## 2024-02-05 NOTE — ED CDU PROVIDER DISPOSITION NOTE - ATTENDING APP SHARED VISIT CONTRIBUTION OF CARE
54 yo F pmh of asthma presents with chest pain. States that yesterday when walking a dog started to feel midsternal chest tightness sensation when walking up a hill, which has since resolved. no palpitations. no shortness of breath. no n/v,. no abdominal pain. no hx of smoking. + FH of heart disease in her dad.     CONSTITUTIONAL: Well-developed; well-nourished; in no acute distress.   SKIN: warm, dry  HEAD: Normocephalic; atraumatic.  EYES: PERRL, EOMI, no conjunctival erythema  ENT: No nasal discharge; airway clear.  NECK: Supple; non tender.  CARD: S1, S2 normal;  Regular rate and rhythm.   RESP: No wheezes, rales or rhonchi.  ABD: soft non tender, non distended, no rebound or guarding  EXT: Normal ROM.  5/5 strength in all 4 extremities   LYMPH: No acute cervical adenopathy.  NEURO: Alert, oriented, grossly unremarkable. neurovascularly intact  PSYCH: Cooperative, appropriate.

## 2024-02-07 ENCOUNTER — APPOINTMENT (OUTPATIENT)
Dept: INTERNAL MEDICINE | Facility: CLINIC | Age: 54
End: 2024-02-07
Payer: MEDICAID

## 2024-02-07 ENCOUNTER — NON-APPOINTMENT (OUTPATIENT)
Age: 54
End: 2024-02-07

## 2024-02-07 ENCOUNTER — OUTPATIENT (OUTPATIENT)
Dept: OUTPATIENT SERVICES | Facility: HOSPITAL | Age: 54
LOS: 1 days | End: 2024-02-07
Payer: MEDICAID

## 2024-02-07 VITALS
DIASTOLIC BLOOD PRESSURE: 77 MMHG | SYSTOLIC BLOOD PRESSURE: 126 MMHG | BODY MASS INDEX: 32.47 KG/M2 | TEMPERATURE: 97.5 F | HEIGHT: 66 IN | OXYGEN SATURATION: 98 % | HEART RATE: 84 BPM | WEIGHT: 202 LBS

## 2024-02-07 DIAGNOSIS — Z00.00 ENCOUNTER FOR GENERAL ADULT MEDICAL EXAMINATION WITHOUT ABNORMAL FINDINGS: ICD-10-CM

## 2024-02-07 DIAGNOSIS — Z00.00 ENCOUNTER FOR GENERAL ADULT MEDICAL EXAMINATION W/OUT ABNORMAL FINDINGS: ICD-10-CM

## 2024-02-07 PROCEDURE — 99214 OFFICE O/P EST MOD 30 MIN: CPT

## 2024-02-07 RX ORDER — FLUTICASONE PROPIONATE AND SALMETEROL 50; 250 UG/1; UG/1
250-50 POWDER RESPIRATORY (INHALATION)
Qty: 13 | Refills: 3 | Status: ACTIVE | COMMUNITY
Start: 2021-05-18 | End: 1900-01-01

## 2024-02-07 RX ORDER — POLYETHYLENE GLYCOL 3350 17 G/17G
17 POWDER, FOR SOLUTION ORAL
Qty: 1 | Refills: 0 | Status: COMPLETED | COMMUNITY
Start: 2021-04-23 | End: 2024-02-07

## 2024-02-07 RX ORDER — TRIAMCINOLONE ACETONIDE 1 MG/G
0.1 CREAM TOPICAL TWICE DAILY
Qty: 1 | Refills: 3 | Status: COMPLETED | COMMUNITY
Start: 2021-12-30 | End: 2024-02-07

## 2024-02-07 RX ORDER — FLUTICASONE PROPIONATE AND SALMETEROL 250; 50 UG/1; UG/1
250-50 POWDER RESPIRATORY (INHALATION)
Qty: 1 | Refills: 0 | Status: COMPLETED | COMMUNITY
Start: 2021-11-29 | End: 2024-02-07

## 2024-02-07 RX ORDER — ALBUTEROL SULFATE 90 UG/1
108 (90 BASE) INHALANT RESPIRATORY (INHALATION) EVERY 4 HOURS
Qty: 81 | Refills: 3 | Status: ACTIVE | COMMUNITY
Start: 2021-02-10 | End: 1900-01-01

## 2024-02-07 RX ORDER — CALCIPOTRIENE 0.05 MG/G
0.01 OINTMENT TOPICAL DAILY
Qty: 1 | Refills: 1 | Status: COMPLETED | COMMUNITY
Start: 2022-09-20 | End: 2024-02-07

## 2024-02-07 NOTE — REVIEW OF SYSTEMS
[Chills] : chills [Fatigue] : fatigue [Hot Flashes] : hot flashes [Night Sweats] : night sweats [Fever] : no fever [Recent Change In Weight] : ~T no recent weight change [Discharge] : no discharge [Pain] : no pain [Redness] : no redness [Dryness] : no dryness  [Vision Problems] : no vision problems [Itching] : no itching [Earache] : no earache [Hearing Loss] : no hearing loss [Nosebleed] : no nosebleeds [Hoarseness] : no hoarseness [Nasal Discharge] : no nasal discharge [Sore Throat] : no sore throat [Postnasal Drip] : no postnasal drip [Chest Pain] : no chest pain [Palpitations] : no palpitations [Leg Claudication] : no leg claudication [Lower Ext Edema] : no lower extremity edema [Orthopnea] : no orthopnea [Paroxysmal Nocturnal Dyspnea] : no paroxysmal nocturnal dyspnea [Shortness Of Breath] : no shortness of breath [Wheezing] : no wheezing [Cough] : no cough [Dyspnea on Exertion] : no dyspnea on exertion [Abdominal Pain] : no abdominal pain [Nausea] : no nausea [Constipation] : no constipation [Diarrhea] : diarrhea [Vomiting] : no vomiting [Heartburn] : no heartburn [Melena] : no melena [Dysuria] : no dysuria [Incontinence] : no incontinence [Nocturia] : no nocturia [Poor Libido] : libido not poor [Hematuria] : no hematuria [Frequency] : no frequency [Vaginal Discharge] : no vaginal discharge [Dysmenorrhea] : no dysmenorrhea [Joint Pain] : no joint pain [Joint Stiffness] : no joint stiffness [Joint Swelling] : no joint swelling [Muscle Weakness] : no muscle weakness [Muscle Pain] : no muscle pain [Back Pain] : no back pain [Itching] : no itching [Nail Changes] : no nail changes [Mole Changes] : no mole changes [Hair Changes] : no hair changes [Skin Rash] : no skin rash [Headache] : no headache [Dizziness] : no dizziness [Fainting] : no fainting [Confusion] : no confusion [Memory Loss] : no memory loss [Unsteady Walking] : no ataxia [Suicidal] : not suicidal [Insomnia] : no insomnia [Anxiety] : no anxiety [Depression] : no depression [Easy Bleeding] : no easy bleeding [Easy Bruising] : no easy bruising [Swollen Glands] : no swollen glands

## 2024-02-07 NOTE — ASSESSMENT
[FreeTextEntry1] : Patient reports that she was unable to do most of the recommended plan.  # Asthma  - Symptoms well controlled - c/w Albuterol and Advair   # H/O Depression/anxiety/PTSD/Eating Disorders - not currently under any medications  - referred to outpatient psychiatric services   # H/O TIERNEY - was previously on CPAP - recurrence of symptoms after recent weight gain - scheduled to follow up with pulmonology   # Recent ED vist for persistent chest pressure - cardiac workup negative - currently no symptoms  - schedule to follow up with cardiology outpatient  # Chronic left knee pain - mild pain on ambulation,  - Referral to physical therapy  # HCM  - Referred for mammogram  - Referred for pap smear  - Referred for colonoscopy  - f/u with routine labs in 1 year.

## 2024-02-07 NOTE — HISTORY OF PRESENT ILLNESS
[FreeTextEntry1] : Follow up [de-identified] : 54 y/o female with pmhx  of  depression, anxiety, asthma, sleep apnea presented for routine follow up. ED visit 3 days ago for persistent chest pressure, for which complete cardiac evaluation was done, CCTA showing CAD-RADS 0, Echo, Troponin and EKG: Normal. Scheduled to follow up with cardiology. Her active complaints this visit are of recurrence of her symptoms of sleep apnea, was previously on CPAP, for which she will following up with pulmonology.

## 2024-02-08 ENCOUNTER — NON-APPOINTMENT (OUTPATIENT)
Age: 54
End: 2024-02-08

## 2024-02-14 DIAGNOSIS — Z00.00 ENCOUNTER FOR GENERAL ADULT MEDICAL EXAMINATION WITHOUT ABNORMAL FINDINGS: ICD-10-CM

## 2024-02-14 DIAGNOSIS — J45.909 UNSPECIFIED ASTHMA, UNCOMPLICATED: ICD-10-CM

## 2024-02-15 ENCOUNTER — APPOINTMENT (OUTPATIENT)
Dept: PULMONOLOGY | Facility: CLINIC | Age: 54
End: 2024-02-15
Payer: MEDICAID

## 2024-02-15 VITALS
OXYGEN SATURATION: 96 % | SYSTOLIC BLOOD PRESSURE: 130 MMHG | HEART RATE: 73 BPM | DIASTOLIC BLOOD PRESSURE: 70 MMHG | BODY MASS INDEX: 32.93 KG/M2 | WEIGHT: 204 LBS

## 2024-02-15 DIAGNOSIS — J45.909 UNSPECIFIED ASTHMA, UNCOMPLICATED: ICD-10-CM

## 2024-02-15 DIAGNOSIS — G47.33 OBSTRUCTIVE SLEEP APNEA (ADULT) (PEDIATRIC): ICD-10-CM

## 2024-02-15 PROCEDURE — 99204 OFFICE O/P NEW MOD 45 MIN: CPT | Mod: 25

## 2024-02-15 PROCEDURE — G2211 COMPLEX E/M VISIT ADD ON: CPT | Mod: NC,1L

## 2024-02-15 PROCEDURE — 94010 BREATHING CAPACITY TEST: CPT

## 2024-02-15 RX ORDER — FLUTICASONE PROPIONATE AND SALMETEROL 500; 50 UG/1; UG/1
500-50 POWDER RESPIRATORY (INHALATION)
Qty: 60 | Refills: 1 | Status: ACTIVE | COMMUNITY
Start: 2024-02-15 | End: 1900-01-01

## 2024-02-15 RX ORDER — PREDNISONE 50 MG/1
50 TABLET ORAL
Qty: 5 | Refills: 0 | Status: ACTIVE | COMMUNITY
Start: 2024-02-15 | End: 1900-01-01

## 2024-02-15 NOTE — HISTORY OF PRESENT ILLNESS
[Former] : former [Current] : current [TextBox_4] : Ms. HORNE is a 53 year woman with a medical history significant for prior tobacco abuse, TIERNEY presenting today to the clinic for ~  ~. She was referred by Dr Edmonds her PCP.  Sick around Xmas, having coughing/bronchitis since then Not going away,  gave doxycycline but she didn't take it She is concerned she is getting COPD On Wixela BID & Albuterol  # Asthma Follow-up 	Daily inhaler use:		3-4x/day 	Nightly inhaler use:		1x/week 	rinsing mouth after wixela  Occupational Hx: 9/11 exposure    # Apnea Screening 	( x ) Snoring while asleep? 	( x ) Tiredness during the day? 	( x ) Observed overnight apnea? 	(  ) Pressure elevated? 	(  ) BMI > 35? 	( x ) Age > 50? 	(  ) Neck circumference >16in? 	(  ) Gender = male?  [TextBox_27] : "not a lot"

## 2024-02-15 NOTE — PROCEDURE
[FreeTextEntry1] : In-office spirometry performed, flow-volume loops appear to demonstrate small airways obstruction, spirometry demonstrates mild obstructive disease.

## 2024-02-27 ENCOUNTER — OUTPATIENT (OUTPATIENT)
Dept: OUTPATIENT SERVICES | Facility: HOSPITAL | Age: 54
LOS: 1 days | End: 2024-02-27
Payer: MEDICAID

## 2024-02-27 ENCOUNTER — APPOINTMENT (OUTPATIENT)
Dept: PULMONOLOGY | Facility: HOSPITAL | Age: 54
End: 2024-02-27
Payer: MEDICAID

## 2024-02-27 DIAGNOSIS — R06.02 SHORTNESS OF BREATH: ICD-10-CM

## 2024-02-27 PROCEDURE — 94727 GAS DIL/WSHOT DETER LNG VOL: CPT | Mod: 26

## 2024-02-27 PROCEDURE — 94729 DIFFUSING CAPACITY: CPT | Mod: 26

## 2024-02-27 PROCEDURE — 94070 EVALUATION OF WHEEZING: CPT

## 2024-02-27 PROCEDURE — 94060 EVALUATION OF WHEEZING: CPT | Mod: 26

## 2024-02-27 PROCEDURE — 94729 DIFFUSING CAPACITY: CPT

## 2024-02-27 PROCEDURE — 94726 PLETHYSMOGRAPHY LUNG VOLUMES: CPT

## 2024-02-27 PROCEDURE — 94664 DEMO&/EVAL PT USE INHALER: CPT

## 2024-02-28 DIAGNOSIS — R06.02 SHORTNESS OF BREATH: ICD-10-CM

## 2024-03-08 ENCOUNTER — OUTPATIENT (OUTPATIENT)
Dept: OUTPATIENT SERVICES | Facility: HOSPITAL | Age: 54
LOS: 1 days | End: 2024-03-08
Payer: MEDICAID

## 2024-03-08 ENCOUNTER — APPOINTMENT (OUTPATIENT)
Dept: PSYCHIATRY | Facility: CLINIC | Age: 54
End: 2024-03-08

## 2024-03-08 DIAGNOSIS — F32.A DEPRESSION, UNSPECIFIED: ICD-10-CM

## 2024-03-08 DIAGNOSIS — F33.1 MAJOR DEPRESSIVE DISORDER, RECURRENT, MODERATE: ICD-10-CM

## 2024-03-08 PROCEDURE — 90791 PSYCH DIAGNOSTIC EVALUATION: CPT

## 2024-03-09 DIAGNOSIS — F33.1 MAJOR DEPRESSIVE DISORDER, RECURRENT, MODERATE: ICD-10-CM

## 2024-03-11 PROBLEM — F32.A DEPRESSION: Status: ACTIVE | Noted: 2021-02-10

## 2024-03-20 ENCOUNTER — OUTPATIENT (OUTPATIENT)
Dept: OUTPATIENT SERVICES | Facility: HOSPITAL | Age: 54
LOS: 1 days | End: 2024-03-20
Payer: MEDICAID

## 2024-03-20 ENCOUNTER — APPOINTMENT (OUTPATIENT)
Dept: PSYCHIATRY | Facility: CLINIC | Age: 54
End: 2024-03-20
Payer: MEDICAID

## 2024-03-20 DIAGNOSIS — F41.1 GENERALIZED ANXIETY DISORDER: ICD-10-CM

## 2024-03-20 PROCEDURE — 90792 PSYCH DIAG EVAL W/MED SRVCS: CPT

## 2024-03-20 PROCEDURE — 99205 OFFICE O/P NEW HI 60 MIN: CPT

## 2024-03-20 NOTE — PHYSICAL EXAM
[Cooperative] : cooperative [Depressed] : depressed [Anxious] : anxious [Labile] : labile [Clear] : clear [Linear/Goal Directed] : linear/goal directed [None] : none [Average] : average [WNL] : within normal limits

## 2024-03-21 DIAGNOSIS — F41.1 GENERALIZED ANXIETY DISORDER: ICD-10-CM

## 2024-03-27 ENCOUNTER — OUTPATIENT (OUTPATIENT)
Dept: OUTPATIENT SERVICES | Facility: HOSPITAL | Age: 54
LOS: 1 days | End: 2024-03-27
Payer: MEDICAID

## 2024-03-27 ENCOUNTER — APPOINTMENT (OUTPATIENT)
Dept: PSYCHIATRY | Facility: CLINIC | Age: 54
End: 2024-03-27
Payer: MEDICAID

## 2024-03-27 DIAGNOSIS — F39 UNSPECIFIED MOOD [AFFECTIVE] DISORDER: ICD-10-CM

## 2024-03-27 DIAGNOSIS — F43.10 POST-TRAUMATIC STRESS DISORDER, UNSPECIFIED: ICD-10-CM

## 2024-03-27 PROCEDURE — ZZZZZ: CPT

## 2024-03-27 PROCEDURE — 99215 OFFICE O/P EST HI 40 MIN: CPT

## 2024-03-28 DIAGNOSIS — F43.10 POST-TRAUMATIC STRESS DISORDER, UNSPECIFIED: ICD-10-CM

## 2024-03-28 DIAGNOSIS — F39 UNSPECIFIED MOOD [AFFECTIVE] DISORDER: ICD-10-CM

## 2024-03-28 NOTE — PHYSICAL EXAM
[Cooperative] : cooperative [Euthymic] : euthymic [Full] : full [Clear] : clear [Linear/Goal Directed] : linear/goal directed [None] : none [None Reported] : none reported [Average] : average [WNL] : within normal limits [de-identified] : Good [de-identified] : Good
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
## 2024-03-28 NOTE — FAMILY HISTORY
[FreeTextEntry1] : Family psychiatric history of Bipolar disorder vs. Schizophrenia in her maternal aunt that included inpatient psychiatric hospitalizations. Also reports Bipolar disorder in paternal grandmother. Patient states she suspects narcissistic personality disorder or traits in her biological parents and suspects autism spectrum disorder in her father as well.

## 2024-03-28 NOTE — PAST MEDICAL HISTORY
[FreeTextEntry1] : Patient states the only medications she takes now is Claritin and Benadryl 50 mg qHS PRN for insomnia
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
## 2024-03-28 NOTE — PHYSICAL EXAM
[Cooperative] : cooperative [Euthymic] : euthymic [Full] : full [Clear] : clear [Linear/Goal Directed] : linear/goal directed [None] : none [None Reported] : none reported [Average] : average [WNL] : within normal limits [de-identified] : Good [de-identified] : Good

## 2024-03-28 NOTE — PLAN
negative [FreeTextEntry5] : - Follow up in 1 month - Start psychotherapy with Elaina Regular rate & rhythm, normal S1, S2; no murmurs, gallops or rubs; no S3, S4
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
## 2024-04-09 NOTE — PAST MEDICAL HISTORY
[FreeTextEntry1] : PMH of sleep apnea, PCOS, and thyroid nodules Patient states the only medications she takes now is Claritin and Benadryl 50 mg qHS PRN for insomnia

## 2024-04-09 NOTE — PLAN
[Admit to Program     (Add Program Admission information to a new column in the Admit/Discharge Flowsheet)] : Admit to program [FreeTextEntry4] : - Follow up in 1 week - Obtain further information at the next visit regarding patient's mental health treatment
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
## 2024-04-11 ENCOUNTER — APPOINTMENT (OUTPATIENT)
Dept: SLEEP CENTER | Facility: HOSPITAL | Age: 54
End: 2024-04-11
Payer: MEDICAID

## 2024-04-11 ENCOUNTER — OUTPATIENT (OUTPATIENT)
Dept: OUTPATIENT SERVICES | Facility: HOSPITAL | Age: 54
LOS: 1 days | Discharge: HOME | End: 2024-04-11
Payer: MEDICAID

## 2024-04-11 DIAGNOSIS — G47.33 OBSTRUCTIVE SLEEP APNEA (ADULT) (PEDIATRIC): ICD-10-CM

## 2024-04-11 PROCEDURE — 95810 POLYSOM 6/> YRS 4/> PARAM: CPT | Mod: 26

## 2024-04-11 PROCEDURE — 95810 POLYSOM 6/> YRS 4/> PARAM: CPT

## 2024-04-11 NOTE — ED PROVIDER NOTE - CARE PLAN
EXAM NOTE      HISTORY    Cesar Negron is a 58 year old male who is here with elevated blood pressure.  His blood pressures have been running more elevated recently.  Denies any side effects to his current medications.  He is on CPAP for sleep apnea.  Denies chest pain or shortness of breath.  He does have hypercholesterolemia and GERD which are stable.      MEDICAL HISTORY    Patient Active Problem List    Diagnosis Date Noted    Benign essential hypertension 04/01/2024     Priority: Low    PASQUALE on CPAP 01/14/2016     Priority: Low     CPAP @ 7 CM; [facility]      Hypercholesteremia 12/29/2015     Priority: Low    GERD (gastroesophageal reflux disease) 10/12/2014     Priority: Low        SOCIAL HISTORY    Cesar reports that he has never smoked. He has never used smokeless tobacco.    MEDICATIONS    Current Outpatient Medications   Medication Sig    losartan (COZAAR) 100 MG tablet Take 1 tablet by mouth daily.    metoPROLOL succinate (TOPROL-XL) 50 MG 24 hr tablet Take 1.5 tablets by mouth daily.    clobetasol 0.05 % shampoo Apply shampoo daily to the scaly areas of the scalp and then 15 minutes later rinse it out in the shower.    clobetasol (TEMOVATE) 0.05 % topical solution Apply twice a day to scaly rash of scalp as needed for scaling    betamethasone dipropionate (DIPROSONE) 0.05 % ointment Apply twice a day to affected area of back as needed    docusate sodium-sennosides (SENOKOT S) 50-8.6 MG per tablet Take 2 tablets by mouth Daily As Needed for Constipation.    ketorolac (TORADOL) 10 MG tablet Take 1 tablet by mouth every 6 hours for 3 days.    omeprazole (PRILOSEC) 20 MG capsule Take 20 mg by mouth daily.     No current facility-administered medications for this visit.        ALLERGIES:   Allergen Reactions    Penicillins HIVES       REVIEW OF SYSTEMS    Reviewed        PHYSICAL EXAM    Visit Vitals  BP (!) 154/104   Pulse 92   Temp 98 °F (36.7 °C)   Ht 5' 10\" (1.778 m)   Wt 114.8 kg (253 lb)   BMI 36.30 kg/m²      Constitutional:  Alert, no acute distress.  Integument:  Warm. Dry. No erythema. No rashes or jaundice.    HEENT:  Normocephalic. Atraumatic. EOMI (extraocular movements intact), conjunctivae normal.         ASSESSMENT & PLAN    1. Benign essential hypertension  Continue losartan, increase metoprolol.  He will update us on his home blood pressures over the next few days  - Comprehensive Metabolic Panel; Future    2. Screening for ischemic heart disease    - CT HEART CALCIUM SCORING; Future    3. Screening for prostate cancer    - PSA; Future    4. Screening cholesterol level  Continue to monitor  - Lipid Panel With Reflex; Future    5. Hypercholesteremia  Continue to monitor    6. Gastroesophageal reflux disease, unspecified whether esophagitis present  Stable          Principal Discharge DX:	Mild asthma with exacerbation, unspecified whether persistent   Principal Discharge DX:	Mild asthma with exacerbation, unspecified whether persistent  Secondary Diagnosis:	Depression

## 2024-04-13 DIAGNOSIS — G47.33 OBSTRUCTIVE SLEEP APNEA (ADULT) (PEDIATRIC): ICD-10-CM

## 2024-04-15 ENCOUNTER — APPOINTMENT (OUTPATIENT)
Dept: PULMONOLOGY | Facility: CLINIC | Age: 54
End: 2024-04-15

## 2024-04-24 ENCOUNTER — NON-APPOINTMENT (OUTPATIENT)
Age: 54
End: 2024-04-24

## 2024-04-29 ENCOUNTER — APPOINTMENT (OUTPATIENT)
Dept: PSYCHIATRY | Facility: CLINIC | Age: 54
End: 2024-04-29

## 2024-04-29 ENCOUNTER — OUTPATIENT (OUTPATIENT)
Dept: OUTPATIENT SERVICES | Facility: HOSPITAL | Age: 54
LOS: 1 days | End: 2024-04-29
Payer: MEDICAID

## 2024-04-29 DIAGNOSIS — F39 UNSPECIFIED MOOD [AFFECTIVE] DISORDER: ICD-10-CM

## 2024-04-29 DIAGNOSIS — F43.10 POST-TRAUMATIC STRESS DISORDER, UNSPECIFIED: ICD-10-CM

## 2024-04-29 PROCEDURE — 90834 PSYTX W PT 45 MINUTES: CPT

## 2024-04-30 DIAGNOSIS — F43.10 POST-TRAUMATIC STRESS DISORDER, UNSPECIFIED: ICD-10-CM

## 2024-04-30 DIAGNOSIS — F39 UNSPECIFIED MOOD [AFFECTIVE] DISORDER: ICD-10-CM

## 2024-04-30 NOTE — PLAN
[[facility]] : [facility]  [Motivational Interviewing] : Motivational Interviewing  [Psychoeducation] : Psychoeducation  [Skills training (all types)] : Skills training (all types)  [Supportive Therapy] : Supportive Therapy [FreeTextEntry2] : Recently referred to individual therapy, no active treatment plan at this time.  [de-identified] : Maggie was recently referred to individual therapy, and today was our first session. The therapist supported the patient in openly discussing her concerning symptoms, current strategies, and their impact on her daily functioning. We began discussing her treatment expectations, and we will complete the treatment plan in the next session.     Action Plan & Practice Task(s): - Paced breathing exercises (x1-3, daily)   Skills Training: - Paced breathing exercises (x1-3, daily)  Recommended services & referrals made:  - 4/29/24, Housing support: [facility] and other community resources  Support Network (established contact consent/PHI Release): - We will discuss in the next session.   Follow-up: - Return in 1 week(s)

## 2024-04-30 NOTE — REASON FOR VISIT
[Patient] : Patient [FreeTextEntry4] : 1:15 PM [FreeTextEntry5] : 2:00 PM [FreeTextEntry1] : Psychotherapy follow-up visit with the treatment diagnoses of 1) (296.90) (F39) Mood disorder, 2) (309.81) (F43.10) PTSD (post-traumatic stress disorder)

## 2024-05-13 ENCOUNTER — OUTPATIENT (OUTPATIENT)
Dept: OUTPATIENT SERVICES | Facility: HOSPITAL | Age: 54
LOS: 1 days | End: 2024-05-13
Payer: MEDICAID

## 2024-05-13 ENCOUNTER — APPOINTMENT (OUTPATIENT)
Dept: PSYCHIATRY | Facility: CLINIC | Age: 54
End: 2024-05-13

## 2024-05-13 DIAGNOSIS — F43.10 POST-TRAUMATIC STRESS DISORDER, UNSPECIFIED: ICD-10-CM

## 2024-05-13 DIAGNOSIS — F39 UNSPECIFIED MOOD [AFFECTIVE] DISORDER: ICD-10-CM

## 2024-05-13 PROCEDURE — 90834 PSYTX W PT 45 MINUTES: CPT

## 2024-05-14 DIAGNOSIS — F43.10 POST-TRAUMATIC STRESS DISORDER, UNSPECIFIED: ICD-10-CM

## 2024-05-14 DIAGNOSIS — F39 UNSPECIFIED MOOD [AFFECTIVE] DISORDER: ICD-10-CM

## 2024-05-14 NOTE — ADDENDUM
[FreeTextEntry1] : The treatment plan was completed within 30 days of the therapy referral. The patient was referred to individual therapy on 4/12/2024, and our first session was on 4/29/2024. The first session was delayed due to the patient's schedule conflicts.

## 2024-05-14 NOTE — REASON FOR VISIT
[Patient] : Patient [FreeTextEntry4] : 2:00 PM [FreeTextEntry5] : 3:00 PM [FreeTextEntry1] : Psychotherapy follow-up visit with the treatment diagnoses of 1) (296.90) (F39) Mood disorder, 2) (309.81) (F43.10) PTSD (post-traumatic stress disorder)

## 2024-05-14 NOTE — END OF VISIT
[Duration of Psychotherapy Visit (minutes spent in synchronous communication): ____] : Duration of Psychotherapy Visit (minutes spent in synchronous communication): [unfilled] [Individual Psychotherapy for 38-52 minutes] : Individual Psychotherapy for 38 - 52 minutes [Licensed Clinician] : Licensed Clinician [Individual Psychotherapy for 53+ minutes] : Individual Psychotherapy for 53+ minutes

## 2024-05-14 NOTE — DISCUSSION/SUMMARY
[Initial Plan] : Initial Plan [Attempting to realize their potential] : Attempting to realize their potential [Motivated to participate in treatment] : motivated to participate in treatment [Has vocational interests or hobbies] : has vocational interests or hobbies [High level of education] : high level of education [English fluency] : English fluency [Access to safe outdoor spaces] : access to safe outdoor spaces [Mental Health] : Mental Health [Initial] : Initial [every ___ months] : every [unfilled] months [every ___ weeks] : every [unfilled] weeks [Improvement in symptoms as evidenced by:] : Improvement in symptoms as evidenced by: [A change in level of care is needed as evidenced by:] : A change in level of care is needed as evidenced by: [Other rationale for transition/discharge:] : Other rationale for transition/discharge: [None - Reason others did not participate:] : None - Reason others did not participate:  [Yes] : Yes [Psychiatric Provider/Prescriber] : Psychiatric Provider/Prescriber [Therapist] : Therapist [FreeTextEntry1] : 5/13/2024 [FreeTextEntry2] : 5/13/2025 [FreeTextEntry3] : 3/20/2024 [FreeTextEntry8] : The patient reported a history of negative experiences with medication treatment but was receptive to exploring available options with the psychiatrist and expressing her thoughts and feelings openly with providers.   [FreeTextEntry9] : Pt verbalized that spirituality is important and serves as a protective factor for her. [de-identified] : Pt will review mood-altering events in session every 1-3 weeks to process triggers and coping skill applications to gain self-awareness and insights.  [de-identified] : 5/13/2025 [FreeTextEntry4] : (Continued Problem/Need I) [de-identified] : Pt will maintain medication management as prescribed daily and attend scheduled appointments.  [de-identified] : 5/13/2025 [FreeTextEntry5] : Acceptance and Commitment Therapy, [facility], Motivational Interviewing, Psychoeducation, and Supportive Therapy [de-identified] : The patient expresses improvement in performing activities of daily living and/or says progress with initial complaint symptoms. In addition, the treatment team will conduct diagnosis-based screenings as needed.  [de-identified] : If the patient is unable to perform activities of daily living and/or expresses suicidal ideation, a higher level of care will be considered. [de-identified] : Other rationales for transition/discharge are granted/applied upon the patient's not communicating with the providers, relocation, self-termination, and/or requests for treatment termination/transfer to another facility.  [de-identified] : Pt declined. [de-identified] : Noah Ha & Vida Marques, Addi Wagner ("Elain[facility]"), and  Elsy Chavez.

## 2024-05-14 NOTE — PLAN
Consent Type: Consent 1 (Standard) [[facility]] : [facility]  [Motivational Interviewing] : Motivational Interviewing  [Psychoeducation] : Psychoeducation  [Skills training (all types)] : Skills training (all types)  [Supportive Therapy] : Supportive Therapy [FreeTextEntry2] : Treatment Goal (effective as of 5/13/24): Maggie will gain x3 coping skills to regulate emotions adequately to maintain her desired weekly routine.  Objective A. Pt will use x1-3 coping skills daily to regulate thoughts and feelings to complete the desired tasks. Objective B. Pt will review mood-altering events in session every 1-3 weeks to process triggers and coping skill applications to gain self-awareness and insights.  Objective C. Pt will maintain medication management as prescribed daily and attend all scheduled appointments.  Recommended Frequency of Visits: Medication Management: Every 1-3 months Individual Therapy: Every 1-3 weeks [de-identified] : Maggie reported having ongoing conversations with Dr. Ha regarding medication treatment and attending scheduled appointments. The patient reported continuing to struggle with overwhelming emotions but putting effort into working regular hours. We continued to discuss her concerning symptoms, current strategies, their impact on her daily functioning, and her treatment expectations. We completed the treatment plan during today's session.     Action Plan & Practice Task(s): - Paced breathing exercises (x1-3, daily) - Review the treatment plan.    Skills Training: - Paced breathing exercises (x1-3, daily)  Recommended services & referrals made:  - 4/29/24, Housing support: [facility] and other community resources  Support Network (established contact consent/PHI Release): - We will discuss in the next session.   Follow-up: - Return in 1 week(s)

## 2024-05-14 NOTE — PLAN
[[facility]] : [facility]  [Motivational Interviewing] : Motivational Interviewing  [Psychoeducation] : Psychoeducation  [Skills training (all types)] : Skills training (all types)  [Supportive Therapy] : Supportive Therapy [FreeTextEntry2] : Treatment Goal (effective as of 5/13/24): Maggie will gain x3 coping skills to regulate emotions adequately to maintain her desired weekly routine.  Objective A. Pt will use x1-3 coping skills daily to regulate thoughts and feelings to complete the desired tasks. Objective B. Pt will review mood-altering events in session every 1-3 weeks to process triggers and coping skill applications to gain self-awareness and insights.  Objective C. Pt will maintain medication management as prescribed daily and attend all scheduled appointments.  Recommended Frequency of Visits: Medication Management: Every 1-3 months Individual Therapy: Every 1-3 weeks [de-identified] : Maggie reported having ongoing conversations with Dr. Ha regarding medication treatment and attending scheduled appointments. The patient reported continuing to struggle with overwhelming emotions but putting effort into working regular hours. We continued to discuss her concerning symptoms, current strategies, their impact on her daily functioning, and her treatment expectations. We completed the treatment plan during today's session.     Action Plan & Practice Task(s): - Paced breathing exercises (x1-3, daily) - Review the treatment plan.    Skills Training: - Paced breathing exercises (x1-3, daily)  Recommended services & referrals made:  - 4/29/24, Housing support: [facility] and other community resources  Support Network (established contact consent/PHI Release): - We will discuss in the next session.   Follow-up: - Return in 1 week(s)

## 2024-05-14 NOTE — DISCUSSION/SUMMARY
[Initial Plan] : Initial Plan [Attempting to realize their potential] : Attempting to realize their potential [Motivated to participate in treatment] : motivated to participate in treatment [Has vocational interests or hobbies] : has vocational interests or hobbies [High level of education] : high level of education [English fluency] : English fluency [Access to safe outdoor spaces] : access to safe outdoor spaces [Mental Health] : Mental Health [Initial] : Initial [every ___ months] : every [unfilled] months [every ___ weeks] : every [unfilled] weeks [Improvement in symptoms as evidenced by:] : Improvement in symptoms as evidenced by: [A change in level of care is needed as evidenced by:] : A change in level of care is needed as evidenced by: [Other rationale for transition/discharge:] : Other rationale for transition/discharge: [None - Reason others did not participate:] : None - Reason others did not participate:  [Yes] : Yes [Psychiatric Provider/Prescriber] : Psychiatric Provider/Prescriber [Therapist] : Therapist [FreeTextEntry1] : 5/13/2024 [FreeTextEntry2] : 5/13/2025 [FreeTextEntry3] : 3/20/2024 [FreeTextEntry8] : The patient reported a history of negative experiences with medication treatment but was receptive to exploring available options with the psychiatrist and expressing her thoughts and feelings openly with providers.   [FreeTextEntry9] : Pt verbalized that spirituality is important and serves as a protective factor for her. [de-identified] : Pt will review mood-altering events in session every 1-3 weeks to process triggers and coping skill applications to gain self-awareness and insights.  [de-identified] : 5/13/2025 [FreeTextEntry4] : (Continued Problem/Need I) [de-identified] : Pt will maintain medication management as prescribed daily and attend scheduled appointments.  [de-identified] : 5/13/2025 [FreeTextEntry5] : Acceptance and Commitment Therapy, [facility], Motivational Interviewing, Psychoeducation, and Supportive Therapy [de-identified] : The patient expresses improvement in performing activities of daily living and/or says progress with initial complaint symptoms. In addition, the treatment team will conduct diagnosis-based screenings as needed.  [de-identified] : If the patient is unable to perform activities of daily living and/or expresses suicidal ideation, a higher level of care will be considered. [de-identified] : Other rationales for transition/discharge are granted/applied upon the patient's not communicating with the providers, relocation, self-termination, and/or requests for treatment termination/transfer to another facility.  [de-identified] : Pt declined. [de-identified] : Noah Ha & Vida Marques, Addi Wagner ("Elaina"), and  Elsy Chavez.

## 2024-05-20 ENCOUNTER — APPOINTMENT (OUTPATIENT)
Dept: PSYCHIATRY | Facility: CLINIC | Age: 54
End: 2024-05-20

## 2024-05-20 ENCOUNTER — OUTPATIENT (OUTPATIENT)
Dept: OUTPATIENT SERVICES | Facility: HOSPITAL | Age: 54
LOS: 1 days | End: 2024-05-20
Payer: MEDICAID

## 2024-05-20 DIAGNOSIS — F43.10 POST-TRAUMATIC STRESS DISORDER, UNSPECIFIED: ICD-10-CM

## 2024-05-20 DIAGNOSIS — F39 UNSPECIFIED MOOD [AFFECTIVE] DISORDER: ICD-10-CM

## 2024-05-20 PROCEDURE — 90834 PSYTX W PT 45 MINUTES: CPT

## 2024-05-21 DIAGNOSIS — F43.10 POST-TRAUMATIC STRESS DISORDER, UNSPECIFIED: ICD-10-CM

## 2024-05-21 DIAGNOSIS — F39 UNSPECIFIED MOOD [AFFECTIVE] DISORDER: ICD-10-CM

## 2024-05-22 ENCOUNTER — NON-APPOINTMENT (OUTPATIENT)
Age: 54
End: 2024-05-22

## 2024-05-22 NOTE — REASON FOR VISIT
[Patient] : Patient [FreeTextEntry4] : 2:00 PM [FreeTextEntry5] : 2:52 PM [FreeTextEntry1] : Psychotherapy follow-up visit with the treatment diagnoses of 1) (296.90) (F39) Mood disorder, 2) (309.81) (F43.10) PTSD (post-traumatic stress disorder)

## 2024-05-22 NOTE — PLAN
[[facility]] : [facility]  [Motivational Interviewing] : Motivational Interviewing  [Psychoeducation] : Psychoeducation  [Skills training (all types)] : Skills training (all types)  [Supportive Therapy] : Supportive Therapy [FreeTextEntry2] : Treatment Goal (effective as of 5/13/24): Maggie will gain x3 coping skills to regulate emotions adequately to maintain her desired weekly routine.  Objective A. Pt will use x1-3 coping skills daily to regulate thoughts and feelings to complete the desired tasks. Objective B. Pt will review mood-altering events in session every 1-3 weeks to process triggers and coping skill applications to gain self-awareness and insights.  Objective C. Pt will maintain medication management as prescribed daily and attend all scheduled appointments.  Recommended Frequency of Visits: Medication Management: Every 1-3 months Individual Therapy: Every 1-3 weeks [de-identified] : Maggie reported having ongoing conversations with Dr. Ha regarding medication treatment and postponed her today's appointment with the psychiatrist due to a schedule conflict. The patient reported continuing to struggle with overwhelming emotions but putting effort into working regular hours. We reviewed recent events that made her feel extremely sad and upset, and Maggie primarily spoke about the recent death of her friend and its impact on her life in various ways. The therapist supported the patient in openly discussing her thoughts and feelings using validation and reflective listening. We discussed ways of breaking the cycle of depression & anxiety. The patient will monitor her thoughts and feelings to redirect when she finds herself ruminating on not-helping thoughts and feelings by changing her bodily posture, moving into different environments/rooms, engaging in other pleasant/distracting activities, etc. We will discuss the outcome in the next session.    Action Plan & Practice Task(s): - Paced breathing exercises (x1-3, daily) - STOP Skills: Stop, Take a step back, Observe, and Proceed mindfully with intention.  Skills Training: - Paced breathing exercises (x1-3, daily)  Recommended services & referrals made: - 4/29/24, Housing support: [facility] and other community resources - 5/20/24:  Social Security Adm for additional resources for financial support  Support Network (established contact consent/PHI Release): - We will discuss in the next session.   Follow-up: - Return in 1 week(s)

## 2024-06-03 ENCOUNTER — OUTPATIENT (OUTPATIENT)
Dept: OUTPATIENT SERVICES | Facility: HOSPITAL | Age: 54
LOS: 1 days | End: 2024-06-03
Payer: MEDICAID

## 2024-06-03 ENCOUNTER — APPOINTMENT (OUTPATIENT)
Dept: PSYCHIATRY | Facility: CLINIC | Age: 54
End: 2024-06-03

## 2024-06-03 DIAGNOSIS — F39 UNSPECIFIED MOOD [AFFECTIVE] DISORDER: ICD-10-CM

## 2024-06-03 DIAGNOSIS — F43.10 POST-TRAUMATIC STRESS DISORDER, UNSPECIFIED: ICD-10-CM

## 2024-06-03 DIAGNOSIS — F33.1 MAJOR DEPRESSIVE DISORDER, RECURRENT, MODERATE: ICD-10-CM

## 2024-06-03 PROCEDURE — 90834 PSYTX W PT 45 MINUTES: CPT

## 2024-06-04 DIAGNOSIS — F43.10 POST-TRAUMATIC STRESS DISORDER, UNSPECIFIED: ICD-10-CM

## 2024-06-04 DIAGNOSIS — F39 UNSPECIFIED MOOD [AFFECTIVE] DISORDER: ICD-10-CM

## 2024-06-05 NOTE — PLAN
[[facility]] : [facility]  [Motivational Interviewing] : Motivational Interviewing  [Psychoeducation] : Psychoeducation  [Skills training (all types)] : Skills training (all types)  [Supportive Therapy] : Supportive Therapy [FreeTextEntry2] : Treatment Goal (effective as of 5/13/24): Maggie will gain x3 coping skills to regulate emotions adequately to maintain her desired weekly routine.  Objective A. Pt will use x1-3 coping skills daily to regulate thoughts and feelings to complete the desired tasks. Objective B. Pt will review mood-altering events in session every 1-3 weeks to process triggers and coping skill applications to gain self-awareness and insights.  Objective C. Pt will maintain medication management as prescribed daily and attend all scheduled appointments.  Recommended Frequency of Visits: Medication Management: Every 1-3 months Individual Therapy: Every 1-3 weeks [de-identified] : Maggie reported having ongoing conversations with Dr. Ha regarding medication treatment and attending scheduled appointments. We planned to meet in person today but changed to telehealth at the last minute upon the patient's request due to her not feeling well, including coughing. She denied worsening symptoms but was not getting better and confirmed to f/u with a medical provider. Maggie reported ongoing struggles with overwhelming emotions but had a positive experience where she could redirect her anxiety-provoking thoughts and make herself feel better. The therapist supported the patient in openly discussing the events, using validation and reflective listening. She demonstrated using STOP Skills (Stop, Take a step back, Observe, and Proceed mindfully with intention) and redirecting her thoughts. We had a moment to embarrass her positive encounters of using coping skills effectively. We will have a f/u discussion to explore other helping elements in the next session.    Action Plan & Practice Task(s): - Paced breathing exercises (x1-3, daily) - STOP Skills: Stop, Take a step back, Observe, and Proceed mindfully with intention.  Skills Training: - Paced breathing exercises (x1-3, daily)  Recommended services & referrals made: - 4/29/24, Housing support: [employer] and other community resources - 5/20/24: [place] Social Security Adm for additional resources for financial support - 6/3/24, [facility] Case Management Services: Pt declined and reported having a case management services, Eli Lee (female, [phone]), [facility].  Support Network (established contact consent/PHI Release): - Adrienne Poncedavina, Friend, [phone] (verbal consent 6/3/24)   Follow-up: - Return in 1 week(s)

## 2024-06-05 NOTE — REASON FOR VISIT
[Patient] : Patient [Patient preference] : as per patient preference [Telehealth (audio & video) - Individual/Group] : This visit was provided via telehealth using real-time 2-way audio visual technology. [Other Location: e.g. Home (Enter Location, City,State)___] : The provider was located at [unfilled]. [Home] : The patient, [unfilled], was located at home, [unfilled], at the time of the visit. [Verbal consent obtained from patient/other participant(s)] : Verbal consent for telehealth/telephonic services obtained from patient/other participant(s) [FreeTextEntry4] : 2:12 PM [FreeTextEntry5] : 2:54 PM [FreeTextEntry1] : Psychotherapy follow-up visit with the treatment diagnoses of 1) (296.90) (F39) Mood disorder, 2) (309.81) (F43.10) PTSD (post-traumatic stress disorder)

## 2024-06-08 ENCOUNTER — NON-APPOINTMENT (OUTPATIENT)
Age: 54
End: 2024-06-08

## 2024-06-10 ENCOUNTER — OUTPATIENT (OUTPATIENT)
Dept: OUTPATIENT SERVICES | Facility: HOSPITAL | Age: 54
LOS: 1 days | End: 2024-06-10
Payer: MEDICAID

## 2024-06-10 ENCOUNTER — APPOINTMENT (OUTPATIENT)
Dept: PSYCHIATRY | Facility: CLINIC | Age: 54
End: 2024-06-10

## 2024-06-10 DIAGNOSIS — F39 UNSPECIFIED MOOD [AFFECTIVE] DISORDER: ICD-10-CM

## 2024-06-10 DIAGNOSIS — F43.10 POST-TRAUMATIC STRESS DISORDER, UNSPECIFIED: ICD-10-CM

## 2024-06-10 PROCEDURE — 90834 PSYTX W PT 45 MINUTES: CPT

## 2024-06-10 NOTE — PLAN
[[facility]] [facility]  [Motivational Interviewing] : Motivational Interviewing  [Psychoeducation] : Psychoeducation  [Skills training (all types)] : Skills training (all types)  [Supportive Therapy] : Supportive Therapy [FreeTextEntry2] : Treatment Goal (effective as of 5/13/24): Maggie will gain x3 coping skills to regulate emotions adequately to maintain her desired weekly routine.  Objective A. Pt will use x1-3 coping skills daily to regulate thoughts and feelings to complete the desired tasks. Objective B. Pt will review mood-altering events in session every 1-3 weeks to process triggers and coping skill applications to gain self-awareness and insights.  Objective C. Pt will maintain medication management as prescribed daily and attend all scheduled appointments.  Recommended Frequency of Visits: Medication Management: Every 1-3 months Individual Therapy: Every 1-3 weeks [de-identified] : Maggie reported having ongoing conversations with the psychiatrist regarding medication treatment and attending scheduled appointments. The patient reported a slightly better mood and presented an overall calmer mood without breaking down in tears, which had occurred during our first two sessions. We continued to discuss her last week's positive experience, where she could redirect her anxiety-provoking thoughts and make herself feel better. The therapist supported the patient in openly discussing the events, using validation and reflective listening. The patient shared and demonstrated that nature is one of her inner strengths. She will use nature as her internal reminder to use STOP Skills (Stop, Take a step back, Observe, and Proceed mindfully with intention) and redirect her thoughts. Today, Maggie began to share with the therapist regarding her childhood adversaries. We ended the session by identifying her helpers/protective elements during her childhood, such as her friend's parents, her childhood pet, etc. We will have a f/u discussion in the next session.    Action Plan & Practice Task(s): - Paced breathing exercises (x1-3, daily) - STOP Skills: Stop, Take a step back, Observe, and Proceed mindfully with intention.  Skills Training: - Paced breathing exercises (x1-3, daily)  Recommended services & referrals made: - 4/29/24, Housing support: [employer] and other community resources - 5/20/24:  Social Security Adm for additional resources for financial support - 6/3/24, [facility] Case Management Services: Pt declined and reported having a case management services, Eli Lee (female, [phone]), [facility].  Support Network (established contact consent/PHI Release): - Adrienne Lóepz, Friend, [phone] (verbal consent 6/3/24)   Follow-up: - Return in 1 week(s)

## 2024-06-10 NOTE — REASON FOR VISIT
[Patient] : Patient [FreeTextEntry4] : 2:04 PM [FreeTextEntry5] : 2:55 PM [FreeTextEntry1] : Psychotherapy follow-up visit with the treatment diagnoses of 1) (296.90) (F39) Mood disorder, 2) (309.81) (F43.10) PTSD (post-traumatic stress disorder)

## 2024-06-11 DIAGNOSIS — F39 UNSPECIFIED MOOD [AFFECTIVE] DISORDER: ICD-10-CM

## 2024-06-11 DIAGNOSIS — F43.10 POST-TRAUMATIC STRESS DISORDER, UNSPECIFIED: ICD-10-CM

## 2024-06-12 ENCOUNTER — APPOINTMENT (OUTPATIENT)
Dept: PSYCHIATRY | Facility: CLINIC | Age: 54
End: 2024-06-12

## 2024-06-12 ENCOUNTER — NON-APPOINTMENT (OUTPATIENT)
Age: 54
End: 2024-06-12

## 2024-06-17 ENCOUNTER — APPOINTMENT (OUTPATIENT)
Dept: PSYCHIATRY | Facility: CLINIC | Age: 54
End: 2024-06-17

## 2024-06-17 ENCOUNTER — OUTPATIENT (OUTPATIENT)
Dept: OUTPATIENT SERVICES | Facility: HOSPITAL | Age: 54
LOS: 1 days | End: 2024-06-17
Payer: MEDICAID

## 2024-06-17 DIAGNOSIS — F39 UNSPECIFIED MOOD [AFFECTIVE] DISORDER: ICD-10-CM

## 2024-06-17 DIAGNOSIS — F43.10 POST-TRAUMATIC STRESS DISORDER, UNSPECIFIED: ICD-10-CM

## 2024-06-17 PROCEDURE — 90837 PSYTX W PT 60 MINUTES: CPT

## 2024-06-17 NOTE — REASON FOR VISIT
[Patient] : Patient [Patient preference] : as per patient preference [Access issues (e.g., transportation, impaired mobility, etc.)] : due to patient's access issues [Telehealth (audio & video) - Individual/Group] : This visit was provided via telehealth using real-time 2-way audio visual technology. [Other Location: e.g. Home (Enter Location, City,State)___] : The provider was located at [unfilled]. [Home] : The patient, [unfilled], was located at home, [unfilled], at the time of the visit. [Verbal consent obtained from patient/other participant(s)] : Verbal consent for telehealth/telephonic services obtained from patient/other participant(s) [FreeTextEntry4] : 2:00 PM [FreeTextEntry5] : 2:55 PM [FreeTextEntry1] : Psychotherapy follow-up visit with the treatment diagnoses of 1) (296.90) (F39) Mood disorder, 2) (309.81) (F43.10) PTSD (post-traumatic stress disorder)

## 2024-06-17 NOTE — PLAN
[[facility]] : [facility]  [Motivational Interviewing] : Motivational Interviewing  [Psychoeducation] : Psychoeducation  [Skills training (all types)] : Skills training (all types)  [Supportive Therapy] : Supportive Therapy [FreeTextEntry2] : Treatment Goal (effective as of 5/13/24): Maggie will gain x3 coping skills to regulate emotions adequately to maintain her desired weekly routine.  Objective A. Pt will use x1-3 coping skills daily to regulate thoughts and feelings to complete the desired tasks. Objective B. Pt will review mood-altering events in session every 1-3 weeks to process triggers and coping skill applications to gain self-awareness and insights.  Objective C. Pt will maintain medication management as prescribed daily and attend all scheduled appointments.  Recommended Frequency of Visits: Medication Management: Every 1-3 months Individual Therapy: Every 1-3 weeks [de-identified] : Maggie reported having ongoing conversations with the psychiatrist regarding medication treatment but missed the last scheduled appointment with him. The therapist recommended that the patient reschedule with the psychiatrist. 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 We discussed her recent encounter with the mother, which upset her. The therapist supported the patient in openly discussing her thoughts and feelings using validation and reflective listening. The patient primarily talked about her childhood, which she described as isolating, invalidating, and painful. She felt only secure with her family dogs. Maggie wanted to explore ways to move forward from her past experiences with the mother using forgiveness and acceptance. We will have a f/u discussion in the next session. Maggie will use meditation to nurture her inner peace and strengths and redirect self-degrading & anxiety-provoking thoughts.   Action Plan & Practice Task(s): - Paced breathing exercises (x1-3, daily) - STOP Skills: Stop, Take a step back, Observe, and Proceed mindfully with intention.  Skills Training: - Paced breathing exercises (x1-3, daily)  Recommended services & referrals made: - 4/29/24, Housing support: [employer] and other community resources - 5/20/24:  Social Security Adm for additional resources for financial support - 6/3/24, [facility] Case Management Services: Pt declined and reported having a case management services, Eli Lee (female, [phone]), [facility].  Support Network (established contact consent/PHI Release): - Adrienne López, Friend, [phone] (verbal consent 6/3/24)   Follow-up: - Return in 2 week(s)

## 2024-06-18 DIAGNOSIS — F39 UNSPECIFIED MOOD [AFFECTIVE] DISORDER: ICD-10-CM

## 2024-06-18 DIAGNOSIS — F43.10 POST-TRAUMATIC STRESS DISORDER, UNSPECIFIED: ICD-10-CM

## 2024-07-01 ENCOUNTER — OUTPATIENT (OUTPATIENT)
Dept: OUTPATIENT SERVICES | Facility: HOSPITAL | Age: 54
LOS: 1 days | End: 2024-07-01
Payer: MEDICAID

## 2024-07-01 ENCOUNTER — APPOINTMENT (OUTPATIENT)
Dept: PSYCHIATRY | Facility: CLINIC | Age: 54
End: 2024-07-01

## 2024-07-01 DIAGNOSIS — F32.A DEPRESSION, UNSPECIFIED: ICD-10-CM

## 2024-07-01 DIAGNOSIS — F43.10 POST-TRAUMATIC STRESS DISORDER, UNSPECIFIED: ICD-10-CM

## 2024-07-01 DIAGNOSIS — F39 UNSPECIFIED MOOD [AFFECTIVE] DISORDER: ICD-10-CM

## 2024-07-01 PROCEDURE — 90834 PSYTX W PT 45 MINUTES: CPT

## 2024-07-02 DIAGNOSIS — F39 UNSPECIFIED MOOD [AFFECTIVE] DISORDER: ICD-10-CM

## 2024-07-02 DIAGNOSIS — F43.10 POST-TRAUMATIC STRESS DISORDER, UNSPECIFIED: ICD-10-CM

## 2024-07-08 ENCOUNTER — APPOINTMENT (OUTPATIENT)
Dept: PSYCHIATRY | Facility: CLINIC | Age: 54
End: 2024-07-08

## 2024-07-08 ENCOUNTER — OUTPATIENT (OUTPATIENT)
Dept: OUTPATIENT SERVICES | Facility: HOSPITAL | Age: 54
LOS: 1 days | End: 2024-07-08
Payer: MEDICAID

## 2024-07-08 VITALS
BODY MASS INDEX: 30.22 KG/M2 | TEMPERATURE: 98.3 F | SYSTOLIC BLOOD PRESSURE: 122 MMHG | DIASTOLIC BLOOD PRESSURE: 76 MMHG | RESPIRATION RATE: 20 BRPM | HEART RATE: 74 BPM | HEIGHT: 66 IN | OXYGEN SATURATION: 97 % | WEIGHT: 188 LBS

## 2024-07-08 DIAGNOSIS — F39 UNSPECIFIED MOOD [AFFECTIVE] DISORDER: ICD-10-CM

## 2024-07-08 DIAGNOSIS — F32.A DEPRESSION, UNSPECIFIED: ICD-10-CM

## 2024-07-08 DIAGNOSIS — F43.10 POST-TRAUMATIC STRESS DISORDER, UNSPECIFIED: ICD-10-CM

## 2024-07-08 PROCEDURE — 90837 PSYTX W PT 60 MINUTES: CPT

## 2024-07-08 PROCEDURE — 99401 PREV MED CNSL INDIV APPRX 15: CPT

## 2024-07-09 ENCOUNTER — NON-APPOINTMENT (OUTPATIENT)
Age: 54
End: 2024-07-09

## 2024-07-09 DIAGNOSIS — F43.10 POST-TRAUMATIC STRESS DISORDER, UNSPECIFIED: ICD-10-CM

## 2024-07-09 DIAGNOSIS — F39 UNSPECIFIED MOOD [AFFECTIVE] DISORDER: ICD-10-CM

## 2024-07-09 DIAGNOSIS — F32.A DEPRESSION, UNSPECIFIED: ICD-10-CM

## 2024-07-18 ENCOUNTER — APPOINTMENT (OUTPATIENT)
Dept: PSYCHIATRY | Facility: CLINIC | Age: 54
End: 2024-07-18

## 2024-07-18 ENCOUNTER — OUTPATIENT (OUTPATIENT)
Dept: OUTPATIENT SERVICES | Facility: HOSPITAL | Age: 54
LOS: 1 days | End: 2024-07-18
Payer: MEDICAID

## 2024-07-18 DIAGNOSIS — F39 UNSPECIFIED MOOD [AFFECTIVE] DISORDER: ICD-10-CM

## 2024-07-18 DIAGNOSIS — F43.10 POST-TRAUMATIC STRESS DISORDER, UNSPECIFIED: ICD-10-CM

## 2024-07-18 PROCEDURE — 90834 PSYTX W PT 45 MINUTES: CPT

## 2024-07-19 DIAGNOSIS — F39 UNSPECIFIED MOOD [AFFECTIVE] DISORDER: ICD-10-CM

## 2024-07-19 DIAGNOSIS — F43.10 POST-TRAUMATIC STRESS DISORDER, UNSPECIFIED: ICD-10-CM

## 2024-07-22 ENCOUNTER — APPOINTMENT (OUTPATIENT)
Dept: PSYCHIATRY | Facility: CLINIC | Age: 54
End: 2024-07-22

## 2024-07-23 NOTE — RISK ASSESSMENT
[Yes, patient reports ideation or behavior] : Yes, patient reports ideation or behavior [No, patient denies ideation or behavior] : No, patient denies ideation or behavior [Low acute suicide risk] : Low acute suicide risk [No] : No [Yes] : Safety Plan completed/updated (for individuals at risk): Yes [FreeTextEntry9] : Pt reported having sporadic fleeting passive suicidal thoughts within the last month on 7/8/24. The patient stated, "I will not end my life because of my cats." She reported, "I will go to ER if I begin to have thoughts about ending my life." [FreeTextEntry3] : The patient denied s/i during the session on 7/8/24, but the safety plan was completed on 7/8/24 considering high PHQ9 outcome.

## 2024-07-23 NOTE — PLAN
[[facility]] [facility]  [Motivational Interviewing] : Motivational Interviewing  [Psychoeducation] : Psychoeducation  [Skills training (all types)] : Skills training (all types)  [Supportive Therapy] : Supportive Therapy [FreeTextEntry2] : Treatment Goal (effective as of 5/13/24): Maggie will gain x3 coping skills to regulate emotions adequately to maintain her desired weekly routine.  Objective A. Pt will use x1-3 coping skills daily to regulate thoughts and feelings to complete the desired tasks. Objective B. Pt will review mood-altering events in session every 1-3 weeks to process triggers and coping skill applications to gain self-awareness and insights.  Objective C. Pt will maintain medication management as prescribed daily and attend all scheduled appointments.  Recommended Frequency of Visits: Medication Management: Every 1-3 months Individual Therapy: Every 1-3 weeks [de-identified] : Maggie was having ongoing conversations with the previous psychiatrist (Dr. Ha) regarding medication treatment but did not start with any medications. Also, she has yet to be connected with the new psychiatrist, Dr. Joseph. The therapist will work with Dr. Joseph and the patient to coordinate a f/u appointment. The patient reported ongoing struggle with labile mood and quickly feeling emotionally overwhelmed and incredibly disappointed in herself for not being able to engage in her desired activities promptly. The patient denied having a recent s/i since our previous session. The therapist supported the patient in openly discussing recent events when she could not engage in her desired activities, exploring barriers, and exploring her thoughts and feelings using validation and reflective listening. The patient reported beginning to use self-compassionate self-talk to gently encourage herself and redirect from having unhelpful and self-degrading thoughts. The therapist supported the patient called her  from [facility] to reconnect with her . She felt relief after making the phone call, and the therapist encouraged the patient to embrace her moment of "feeling relief." We explored ways to increase feeling grounded and focus by using body movement/gestures, and ended the session with upper body movement that Maggie came up with. Maggie will continue to use self-compassionate self-talk to gently encourage and remind herself of the small steps she could take to get to her desired tasks. Also, she will use the upper body movement to bring her focus and attention to tasks at her hand. We will have a f/u discussion during the next session.    Action Plan & Practice Task(s): - Paced breathing exercises (x1-3, daily) - STOP Skills: Stop, Take a step back, Observe, and Proceed mindfully with intention. - Self-compassionate self-talk: Using gentle tone and words.  Skills Training: - Paced breathing exercises (x1-3, daily)  Recommended services & referrals made: - 4/29/24, Housing support: [employer] and other community resources - 5/20/24:  Social Security Adm for additional resources for financial support - 6/3/24, [facility] Case Management Services: Pt declined and reported having a case management services, Eli Lee (female, [phone]), [facility].  Support Network (established contact consent/PHI Release): - Adrienne López, Friend, [phone] (verbal consent 6/3/24) - Eli Lee,  from [facility], [phone]   Follow-up: - Return in 1 week(s)

## 2024-07-23 NOTE — PLAN
[[facility]] : [facility]  [Motivational Interviewing] : Motivational Interviewing  [Psychoeducation] : Psychoeducation  [Skills training (all types)] : Skills training (all types)  [Supportive Therapy] : Supportive Therapy [FreeTextEntry2] : Treatment Goal (effective as of 5/13/24): Maggie will gain x3 coping skills to regulate emotions adequately to maintain her desired weekly routine.  Objective A. Pt will use x1-3 coping skills daily to regulate thoughts and feelings to complete the desired tasks. Objective B. Pt will review mood-altering events in session every 1-3 weeks to process triggers and coping skill applications to gain self-awareness and insights.  Objective C. Pt will maintain medication management as prescribed daily and attend all scheduled appointments.  Recommended Frequency of Visits: Medication Management: Every 1-3 months Individual Therapy: Every 1-3 weeks [de-identified] : Maggie was having ongoing conversations with the previous psychiatrist (Dr. Ha) regarding medication treatment but did not start with any medications. Also, she has yet to be connected with the new psychiatrist, Dr. Joseph. The therapist will work with Dr. Joseph and the patient to coordinate a f/u appointment. The patient reported ongoing struggle with labile mood and quickly feeling emotionally overwhelmed and incredibly disappointed in herself for not being able to engage in her desired activities promptly. The patient denied having a recent s/i since our previous session. The therapist supported the patient in openly discussing recent events when she could not engage in her desired activities, exploring barriers, and exploring her thoughts and feelings using validation and reflective listening. The patient reported beginning to use self-compassionate self-talk to gently encourage herself and redirect from having unhelpful and self-degrading thoughts. The therapist supported the patient called her  from [facility] to reconnect with her . She felt relief after making the phone call, and the therapist encouraged the patient to embrace her moment of "feeling relief." We explored ways to increase feeling grounded and focus by using body movement/gestures, and ended the session with upper body movement that Maggie came up with. Maggie will continue to use self-compassionate self-talk to gently encourage and remind herself of the small steps she could take to get to her desired tasks. Also, she will use the upper body movement to bring her focus and attention to tasks at her hand. We will have a f/u discussion during the next session.    Action Plan & Practice Task(s): - Paced breathing exercises (x1-3, daily) - STOP Skills: Stop, Take a step back, Observe, and Proceed mindfully with intention. - Self-compassionate self-talk: Using gentle tone and words.  Skills Training: - Paced breathing exercises (x1-3, daily)  Recommended services & referrals made: - 4/29/24, Housing support: [employer] and other community resources - 5/20/24:  Social Security Adm for additional resources for financial support - 6/3/24, [facility] Case Management Services: Pt declined and reported having a case management services, Eli Lee (female, [phone]), [facility].  Support Network (established contact consent/PHI Release): - Adrienne López, Friend, [phone] (verbal consent 6/3/24) - Eli Lee,  from [facility], [phone]   Follow-up: - Return in 1 week(s)

## 2024-07-23 NOTE — REASON FOR VISIT
[Patient preference] : as per patient preference [Continuity of care] : to ensure continuity of care [Telehealth (audio & video) - Individual/Group] : This visit was provided via telehealth using real-time 2-way audio visual technology. [Other Location: e.g. Home (Enter Location, City,State)___] : The provider was located at [unfilled]. [Home] : The patient, [unfilled], was located at home, [unfilled], at the time of the visit. [Verbal consent obtained from patient/other participant(s)] : Verbal consent for telehealth/telephonic services obtained from patient/other participant(s) [Patient] : Patient [FreeTextEntry4] : 2:00 PM [FreeTextEntry5] : 2:50 PM [FreeTextEntry1] : Psychotherapy follow-up visit with the treatment diagnoses of 1) (296.90) (F39) Mood disorder, 2) (309.81) (F43.10) PTSD (post-traumatic stress disorder)

## 2024-07-23 NOTE — PLAN
[[facility]] : [facility]  [Motivational Interviewing] : Motivational Interviewing  [Psychoeducation] : Psychoeducation  [Skills training (all types)] : Skills training (all types)  [Supportive Therapy] : Supportive Therapy [FreeTextEntry2] : Treatment Goal (effective as of 5/13/24): Maggie will gain x3 coping skills to regulate emotions adequately to maintain her desired weekly routine.  Objective A. Pt will use x1-3 coping skills daily to regulate thoughts and feelings to complete the desired tasks. Objective B. Pt will review mood-altering events in session every 1-3 weeks to process triggers and coping skill applications to gain self-awareness and insights.  Objective C. Pt will maintain medication management as prescribed daily and attend all scheduled appointments.  Recommended Frequency of Visits: Medication Management: Every 1-3 months Individual Therapy: Every 1-3 weeks [de-identified] : Maggie was having ongoing conversations with the previous psychiatrist (Dr. Ha) regarding medication treatment but did not start with any medications. Also, she has yet to be connected with the new psychiatrist, Dr. Joseph. The therapist will work with Dr. Joseph and the patient to coordinate a f/u appointment. The patient reported ongoing struggle with labile mood and quickly feeling emotionally overwhelmed and incredibly disappointed in herself for not being able to engage in her desired activities promptly. The patient denied having a recent s/i since our previous session. The therapist supported the patient in openly discussing recent events when she could not engage in her desired activities, exploring barriers, and exploring her thoughts and feelings using validation and reflective listening. The patient reported beginning to use self-compassionate self-talk to gently encourage herself and redirect from having unhelpful and self-degrading thoughts. The therapist supported the patient called her  from [facility] to reconnect with her . She felt relief after making the phone call, and the therapist encouraged the patient to embrace her moment of "feeling relief." We explored ways to increase feeling grounded and focus by using body movement/gestures, and ended the session with upper body movement that Maggie came up with. Maggie will continue to use self-compassionate self-talk to gently encourage and remind herself of the small steps she could take to get to her desired tasks. Also, she will use the upper body movement to bring her focus and attention to tasks at her hand. We will have a f/u discussion during the next session.    Action Plan & Practice Task(s): - Paced breathing exercises (x1-3, daily) - STOP Skills: Stop, Take a step back, Observe, and Proceed mindfully with intention. - Self-compassionate self-talk: Using gentle tone and words.  Skills Training: - Paced breathing exercises (x1-3, daily)  Recommended services & referrals made: - 4/29/24, Housing support: [employer] and other community resources - 5/20/24:  Social Security Adm for additional resources for financial support - 6/3/24, [facility] Case Management Services: Pt declined and reported having a case management services, Eli Lee (female, [phone]), [facility].  Support Network (established contact consent/PHI Release): - Adrienne López, Friend, [phone] (verbal consent 6/3/24) - Eli Lee,  from [facility], [phone]   Follow-up: - Return in 1 week(s)

## 2024-07-29 ENCOUNTER — OUTPATIENT (OUTPATIENT)
Dept: OUTPATIENT SERVICES | Facility: HOSPITAL | Age: 54
LOS: 1 days | End: 2024-07-29
Payer: MEDICAID

## 2024-07-29 ENCOUNTER — APPOINTMENT (OUTPATIENT)
Dept: PSYCHIATRY | Facility: CLINIC | Age: 54
End: 2024-07-29

## 2024-07-29 DIAGNOSIS — F39 UNSPECIFIED MOOD [AFFECTIVE] DISORDER: ICD-10-CM

## 2024-07-29 DIAGNOSIS — F43.10 POST-TRAUMATIC STRESS DISORDER, UNSPECIFIED: ICD-10-CM

## 2024-07-29 PROCEDURE — 90834 PSYTX W PT 45 MINUTES: CPT | Mod: 95

## 2024-07-29 NOTE — PLAN
[[facility]] : [facility]  [Motivational Interviewing] : Motivational Interviewing  [Psychoeducation] : Psychoeducation  [Skills training (all types)] : Skills training (all types)  [Supportive Therapy] : Supportive Therapy [FreeTextEntry2] : Treatment Goal (effective as of 5/13/24): Maggie will gain x3 coping skills to regulate emotions adequately to maintain her desired weekly routine.  Objective A. Pt will use x1-3 coping skills daily to regulate thoughts and feelings to complete the desired tasks. Objective B. Pt will review mood-altering events in session every 1-3 weeks to process triggers and coping skill applications to gain self-awareness and insights.  Objective C. Pt will maintain medication management as prescribed daily and attend all scheduled appointments.  Recommended Frequency of Visits: Medication Management: Every 1-3 months Individual Therapy: Every 1-3 weeks [de-identified] : Maggie was having ongoing conversations with the previous psychiatrist (Dr. Ha) regarding medication treatment but did not start with any medications. Maggie confirmed receiving a voicemail from Dr. Joseph and would call him back. The patient reported ongoing struggle with quickly feeling emotionally overwhelmed and difficulty managing intrusive self-degrading thoughts. She denied having s/i since our previous meeting. The patient denied having a recent s/i since our last session. The therapist supported the patient in openly discussing recent specific events when she had intrusive negative thoughts and their impact on her mood and behaviors using validation and reflective listening. We discussed the cycle of anxiety and depression and ways to loosen the chain of the cycle by interrupting her negative thoughts briefly to gain moments of breaks from the thoughts. We briefly explored distracting skills and ways to nurture her mind with positive thoughts. We decided to continue with the topic during the next session. Maggie will continue to use self-compassionate self-talk to encourage herself to continue taking small steps toward her desired activities. Also, she will continue to use the upper body movement to bring her focus and attention to tasks at her hand. We will have a f/u discussion during the next session.    Action Plan & Practice Task(s): - Paced breathing exercises (x1-3, daily) - STOP Skills: Stop, Take a step back, Observe, and Proceed mindfully with intention. - Self-compassionate self-talk: Using gentle tone and words.  Skills Training: - Paced breathing exercises (x1-3, daily)  Recommended services & referrals made: - 4/29/24, Housing support: [employer] and other community resources - 5/20/24:  Social Security Adm for additional resources for financial support - 6/3/24, [facility] Case Management Services: Pt declined and reported having a case management services, Eli Lee (female, [phone]), [facility].  Support Network (established contact consent/PHI Release): - Adrienne López, Friend, [phone] (verbal consent 6/3/24) - Eli Lee,  from [facility], [phone]   Follow-up: - Return in 1 week(s)

## 2024-07-29 NOTE — PLAN
[[facility]] : [facility]  [Motivational Interviewing] : Motivational Interviewing  [Psychoeducation] : Psychoeducation  [Skills training (all types)] : Skills training (all types)  [Supportive Therapy] : Supportive Therapy [FreeTextEntry2] : Treatment Goal (effective as of 5/13/24): Maggie will gain x3 coping skills to regulate emotions adequately to maintain her desired weekly routine.  Objective A. Pt will use x1-3 coping skills daily to regulate thoughts and feelings to complete the desired tasks. Objective B. Pt will review mood-altering events in session every 1-3 weeks to process triggers and coping skill applications to gain self-awareness and insights.  Objective C. Pt will maintain medication management as prescribed daily and attend all scheduled appointments.  Recommended Frequency of Visits: Medication Management: Every 1-3 months Individual Therapy: Every 1-3 weeks [de-identified] : Maggie was having ongoing conversations with the previous psychiatrist (Dr. Ha) regarding medication treatment but did not start with any medications. Maggie confirmed receiving a voicemail from Dr. Joseph and would call him back. The patient reported ongoing struggle with quickly feeling emotionally overwhelmed and difficulty managing intrusive self-degrading thoughts. She denied having s/i since our previous meeting. The patient denied having a recent s/i since our last session. The therapist supported the patient in openly discussing recent specific events when she had intrusive negative thoughts and their impact on her mood and behaviors using validation and reflective listening. We discussed the cycle of anxiety and depression and ways to loosen the chain of the cycle by interrupting her negative thoughts briefly to gain moments of breaks from the thoughts. We briefly explored distracting skills and ways to nurture her mind with positive thoughts. We decided to continue with the topic during the next session. Maggie will continue to use self-compassionate self-talk to encourage herself to continue taking small steps toward her desired activities. Also, she will continue to use the upper body movement to bring her focus and attention to tasks at her hand. We will have a f/u discussion during the next session.    Action Plan & Practice Task(s): - Paced breathing exercises (x1-3, daily) - STOP Skills: Stop, Take a step back, Observe, and Proceed mindfully with intention. - Self-compassionate self-talk: Using gentle tone and words.  Skills Training: - Paced breathing exercises (x1-3, daily)  Recommended services & referrals made: - 4/29/24, Housing support: [employer] and other community resources - 5/20/24:  Social Security Adm for additional resources for financial support - 6/3/24, [facility] Case Management Services: Pt declined and reported having a case management services, Eli Lee (female, [phone]), [facility] [place].  Support Network (established contact consent/PHI Release): - Adrienne López, Friend, [phone] (verbal consent 6/3/24) - Eli Lee,  from [facility], [phone]   Follow-up: - Return in 1 week(s)

## 2024-07-29 NOTE — REASON FOR VISIT
[Patient preference] : as per patient preference [Continuity of care] : to ensure continuity of care [Telehealth (audio & video) - Individual/Group] : This visit was provided via telehealth using real-time 2-way audio visual technology. [Other Location: e.g. Home (Enter Location, City,State)___] : The patient, [unfilled], was located at [unfilled] at the time of the visit. [Verbal consent obtained from patient/other participant(s)] : Verbal consent for telehealth/telephonic services obtained from patient/other participant(s) [Patient] : Patient [FreeTextEntry4] : 2:00 PM [FreeTextEntry5] : 2:52 PM [FreeTextEntry1] : Psychotherapy follow-up visit with the treatment diagnoses of 1) (296.90) (F39) Mood disorder, 2) (309.81) (F43.10) PTSD (post-traumatic stress disorder)

## 2024-07-30 DIAGNOSIS — F43.10 POST-TRAUMATIC STRESS DISORDER, UNSPECIFIED: ICD-10-CM

## 2024-07-30 DIAGNOSIS — F39 UNSPECIFIED MOOD [AFFECTIVE] DISORDER: ICD-10-CM

## 2024-08-05 ENCOUNTER — APPOINTMENT (OUTPATIENT)
Dept: PSYCHIATRY | Facility: CLINIC | Age: 54
End: 2024-08-05

## 2024-08-05 ENCOUNTER — OUTPATIENT (OUTPATIENT)
Dept: OUTPATIENT SERVICES | Facility: HOSPITAL | Age: 54
LOS: 1 days | End: 2024-08-05
Payer: MEDICAID

## 2024-08-05 DIAGNOSIS — F43.10 POST-TRAUMATIC STRESS DISORDER, UNSPECIFIED: ICD-10-CM

## 2024-08-05 DIAGNOSIS — F39 UNSPECIFIED MOOD [AFFECTIVE] DISORDER: ICD-10-CM

## 2024-08-05 PROCEDURE — 90834 PSYTX W PT 45 MINUTES: CPT

## 2024-08-05 NOTE — REASON FOR VISIT
[Patient preference] : as per patient preference [Continuity of care] : to ensure continuity of care [Telehealth (audio & video) - Individual/Group] : This visit was provided via telehealth using real-time 2-way audio visual technology. [Verbal consent obtained from patient/other participant(s)] : Verbal consent for telehealth/telephonic services obtained from patient/other participant(s) [Patient] : Patient [Other Location: e.g. Home (Enter Location, City,State)___] : The provider was located at [unfilled]. [Home] : The patient, [unfilled], was located at home, [unfilled], at the time of the visit. [FreeTextEntry4] : 2:00 PM [FreeTextEntry5] : 2:50 PM [FreeTextEntry1] : Psychotherapy follow-up visit with the treatment diagnoses of 1) (296.90) (F39) Mood disorder, 2) (309.81) (F43.10) PTSD (post-traumatic stress disorder)

## 2024-08-05 NOTE — PLAN
[[facility]] : [facility]  [Motivational Interviewing] : Motivational Interviewing  [Psychoeducation] : Psychoeducation  [Skills training (all types)] : Skills training (all types)  [Supportive Therapy] : Supportive Therapy [FreeTextEntry2] : Treatment Goal (effective as of 5/13/24): Maggie will gain x3 coping skills to regulate emotions adequately to maintain her desired weekly routine.  Objective A. Pt will use x1-3 coping skills daily to regulate thoughts and feelings to complete the desired tasks. Objective B. Pt will review mood-altering events in session every 1-3 weeks to process triggers and coping skill applications to gain self-awareness and insights.  Objective C. Pt will maintain medication management as prescribed daily and attend all scheduled appointments (Medication Management: Every 1-3 months & Individual Therapy: Every 1-3 weeks).  [de-identified] : Maggie was having ongoing conversations with the previous psychiatrist (Dr. Ha) regarding medication treatment but did not start with any medications. Maggie confirmed receiving a voicemail from Dr. Joseph and would call him back. The patient reported ongoing struggle with quickly feeling emotionally overwhelmed and difficulty managing intrusive self-degrading thoughts.   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 We reviewed her self-talk and STOP skills utilizations, making slow progress because they are new skills to her. Maggie will add small helping thoughts in her self-talk to redirect her automatic thoughts and use the STOP skills to slow down her automatic self-degrading thoughts. Also, she will continue to use the upper body movement to bring her focus and attention to tasks at her hand. We will have a f/u discussion during the next session.    Action Plan & Practice Task(s) in Progress: - STOP Skills: Stop, Take a step back, Observe, and Proceed mindfully with intention (x1-5, Daily). - Self-compassionate self-talk: Using gentle tone and words (x1-5, Daily). - Grounding movements (x1-5, Daily).  Skills Training: - Paced breathing exercises (x1-3, daily)  Recommended services & referrals made: - 4/29/24, Housing support: [employer] and other community resources - 5/20/24:  Social Security Adm for additional resources for financial support - 6/3/24, [facility] Case Management Services: Pt declined and reported having a case management services, Eli Lee (female, [phone]), [facility].  Support Network (established contact consent/PHI Release): - Adrienne López, Friend, [phone] (verbal consent 6/3/24) - Eli Lee,  from [facility], [phone]   Follow-up: - Return in 1 week(s)

## 2024-08-05 NOTE — PLAN
[[facility]] : [facility]  [Motivational Interviewing] : Motivational Interviewing  [Psychoeducation] : Psychoeducation  [Skills training (all types)] : Skills training (all types)  [Supportive Therapy] : Supportive Therapy [FreeTextEntry2] : Treatment Goal (effective as of 5/13/24): Maggie will gain x3 coping skills to regulate emotions adequately to maintain her desired weekly routine.  Objective A. Pt will use x1-3 coping skills daily to regulate thoughts and feelings to complete the desired tasks. Objective B. Pt will review mood-altering events in session every 1-3 weeks to process triggers and coping skill applications to gain self-awareness and insights.  Objective C. Pt will maintain medication management as prescribed daily and attend all scheduled appointments (Medication Management: Every 1-3 months & Individual Therapy: Every 1-3 weeks).  [de-identified] : Maggie was having ongoing conversations with the previous psychiatrist (Dr. Ha) regarding medication treatment but did not start with any medications. Maggie confirmed receiving a voicemail from Dr. Joseph and would call him back. The patient reported ongoing struggle with quickly feeling emotionally overwhelmed and difficulty managing intrusive self-degrading thoughts.   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 We reviewed her self-talk and STOP skills utilizations, making slow progress because they are new skills to her. Maggie will add small helping thoughts in her self-talk to redirect her automatic thoughts and use the STOP skills to slow down her automatic self-degrading thoughts. Also, she will continue to use the upper body movement to bring her focus and attention to tasks at her hand. We will have a f/u discussion during the next session.    Action Plan & Practice Task(s) in Progress: - STOP Skills: Stop, Take a step back, Observe, and Proceed mindfully with intention (x1-5, Daily). - Self-compassionate self-talk: Using gentle tone and words (x1-5, Daily). - Grounding movements (x1-5, Daily).  Skills Training: - Paced breathing exercises (x1-3, daily)  Recommended services & referrals made: - 4/29/24, Housing support: [employer] and other community resources - 5/20/24:  Social Security Adm for additional resources for financial support - 6/3/24, [facility] Case Management Services: Pt declined and reported having a case management services, Eli Lee (female, [phone]), [facility].  Support Network (established contact consent/PHI Release): - Adrienne López, Friend, [phone] (verbal consent 6/3/24) - Eli Lee,  from [facility], [phone]   Follow-up: - Return in 1 week(s)

## 2024-08-06 DIAGNOSIS — F39 UNSPECIFIED MOOD [AFFECTIVE] DISORDER: ICD-10-CM

## 2024-08-06 DIAGNOSIS — F43.10 POST-TRAUMATIC STRESS DISORDER, UNSPECIFIED: ICD-10-CM

## 2024-08-07 ENCOUNTER — NON-APPOINTMENT (OUTPATIENT)
Age: 54
End: 2024-08-07

## 2024-08-12 ENCOUNTER — APPOINTMENT (OUTPATIENT)
Dept: PSYCHIATRY | Facility: CLINIC | Age: 54
End: 2024-08-12

## 2024-08-13 NOTE — REASON FOR VISIT
[Patient preference] : as per patient preference [Continuity of care] : to ensure continuity of care [Telehealth (audio & video) - Individual/Group] : This visit was provided via telehealth using real-time 2-way audio visual technology. [Other Location: e.g. Home (Enter Location, City,State)___] : The provider was located at [unfilled]. [Home] : The patient, [unfilled], was located at home, [unfilled], at the time of the visit. [Verbal consent obtained from patient/other participant(s)] : Verbal consent for telehealth/telephonic services obtained from patient/other participant(s) [Patient with collateral] : Patient with collateral  [] :  [FreeTextEntry4] : 2:06 PM [FreeTextEntry5] : 2:57 PM [TextBox_17] : Vale Lee,  at [facility], [phone] [FreeTextEntry1] : Psychotherapy follow-up visit with the treatment diagnoses of 1) (296.90) (F39) Mood disorder, 2) (309.81) (F43.10) PTSD (post-traumatic stress disorder)

## 2024-08-13 NOTE — REASON FOR VISIT
[Patient preference] : as per patient preference [Continuity of care] : to ensure continuity of care [Telehealth (audio & video) - Individual/Group] : This visit was provided via telehealth using real-time 2-way audio visual technology. [Other Location: e.g. Home (Enter Location, City,State)___] : The provider was located at [unfilled]. [Home] : The patient, [unfilled], was located at home, [unfilled], at the time of the visit. [Verbal consent obtained from patient/other participant(s)] : Verbal consent for telehealth/telephonic services obtained from patient/other participant(s) [Patient with collateral] : Patient with collateral  [] :  [FreeTextEntry4] : 2:06 PM [FreeTextEntry5] : 2:57 PM [TextBox_17] : Vale Lee,  at [facility], [phone] [FreeTextEntry1] : Psychotherapy follow-up visit with the treatment diagnoses of 1) (296.90) (F39) Mood disorder, 2) (309.81) (F43.10) PTSD (post-traumatic stress disorder)

## 2024-08-13 NOTE — PLAN
[[facility]] : [facility]  [Motivational Interviewing] : Motivational Interviewing  [Psychoeducation] : Psychoeducation  [Skills training (all types)] : Skills training (all types)  [Supportive Therapy] : Supportive Therapy [FreeTextEntry2] : Treatment Goal (effective as of 5/13/24): Maggie will gain x3 coping skills to regulate emotions adequately to maintain her desired weekly routine.  Objective A. Pt will use x1-3 coping skills daily to regulate thoughts and feelings to complete the desired tasks. Objective B. Pt will review mood-altering events in session every 1-3 weeks to process triggers and coping skill applications to gain self-awareness and insights.  Objective C. Pt will maintain medication management as prescribed daily and attend all scheduled appointments (Medication Management: Every 1-3 months & Individual Therapy: Every 1-3 weeks).  [de-identified] : Maggie was having ongoing conversations with the previous psychiatrist (Dr. Ha) regarding medication treatment but did not start with any medications. Maggie confirmed receiving a voicemail from Dr. Joseph and would call him back. The patient reported "feeling better today" after slightly improving her eating habits. However, she reported having an ongoing struggle with quickly feeling emotionally overwhelmed and having difficulty completing her desired tasks most of the time, such as calling back Dr. Joseph and her  to discuss areas of her needs. Thus, we invited the  during today's session, and the therapist supported the patient in openly discussing her needs. We will have a follow-up discussion during the next session to process today's encounter.     Action Plan & Practice Task(s) in Progress: - STOP Skills: Stop, Take a step back, Observe, and Proceed mindfully with intention (x1-5, Daily). - Self-compassionate self-talk: Using gentle tone and words (x1-5, Daily). - Grounding movements (x1-5, Daily).  Skills Training: - Paced breathing exercises (x1-3, daily)  Recommended services & referrals made: - 4/29/24, Housing support: [employer] and other community resources - 5/20/24:  Social Security Adm for additional resources for financial support - 6/3/24, [facility] Case Management Services: Pt declined and reported having a case management services, Eli Lee (female, [phone]), [facility].  Support Network (established contact consent/PHI Release): - Adrienne López, Friend, [phone] (verbal consent 6/3/24) - Eli Lee,  from [facility], [phone]   Follow-up: - Return in 1 week(s)

## 2024-08-19 ENCOUNTER — OUTPATIENT (OUTPATIENT)
Dept: OUTPATIENT SERVICES | Facility: HOSPITAL | Age: 54
LOS: 1 days | End: 2024-08-19
Payer: MEDICAID

## 2024-08-19 ENCOUNTER — APPOINTMENT (OUTPATIENT)
Dept: PSYCHIATRY | Facility: CLINIC | Age: 54
End: 2024-08-19

## 2024-08-19 DIAGNOSIS — F39 UNSPECIFIED MOOD [AFFECTIVE] DISORDER: ICD-10-CM

## 2024-08-19 DIAGNOSIS — F43.10 POST-TRAUMATIC STRESS DISORDER, UNSPECIFIED: ICD-10-CM

## 2024-08-19 PROCEDURE — 90834 PSYTX W PT 45 MINUTES: CPT

## 2024-08-20 DIAGNOSIS — F43.10 POST-TRAUMATIC STRESS DISORDER, UNSPECIFIED: ICD-10-CM

## 2024-08-20 DIAGNOSIS — F39 UNSPECIFIED MOOD [AFFECTIVE] DISORDER: ICD-10-CM

## 2024-08-21 NOTE — RISK ASSESSMENT
[Yes, patient reports ideation or behavior] : Yes, patient reports ideation or behavior [No, patient denies ideation or behavior] : No, patient denies ideation or behavior [Low acute suicide risk] : Low acute suicide risk [No] : No [Yes] : Safety Plan completed/updated (for individuals at risk): Yes [FreeTextEntry9] : Pt reported having sporadic fleeting passive suicidal thoughts with the last experience on 7/8/24. The patient stated, "I will not end my life because of my cats." She reported, "I will go to ER if I begin to have thoughts about ending my life." [FreeTextEntry3] : The patient denied s/i during the session on 7/8/24, but the safety plan was completed on 7/8/24 considering high PHQ9 outcome.

## 2024-08-21 NOTE — REASON FOR VISIT
[Patient preference] : as per patient preference [Continuity of care] : to ensure continuity of care [Telehealth (audio & video) - Individual/Group] : This visit was provided via telehealth using real-time 2-way audio visual technology. [Other Location: e.g. Home (Enter Location, City,State)___] : The provider was located at [unfilled]. [Home] : The patient, [unfilled], was located at home, [unfilled], at the time of the visit. [Verbal consent obtained from patient/other participant(s)] : Verbal consent for telehealth/telephonic services obtained from patient/other participant(s) [Patient with collateral] : Patient with collateral  [] :  [TextBox_17] : Vale Lee,  at [facility], [phone] [Patient] : Patient [FreeTextEntry4] : 2:01 PM [FreeTextEntry5] : 2:53 PM [FreeTextEntry1] : Psychotherapy follow-up visit with the treatment diagnoses of 1) (296.90) (F39) Mood disorder, 2) (309.81) (F43.10) PTSD (post-traumatic stress disorder)

## 2024-08-21 NOTE — REASON FOR VISIT
[Patient preference] : as per patient preference [Continuity of care] : to ensure continuity of care [Telehealth (audio & video) - Individual/Group] : This visit was provided via telehealth using real-time 2-way audio visual technology. [Other Location: e.g. Home (Enter Location, City,State)___] : The provider was located at [unfilled]. [Home] : The patient, [unfilled], was located at home, [unfilled], at the time of the visit. [Verbal consent obtained from patient/other participant(s)] : Verbal consent for telehealth/telephonic services obtained from patient/other participant(s) [Patient with collateral] : Patient with collateral  [] :  [TextBox_17] : Vale Lee,  at [facility], [phone] [Patient] : Patient [FreeTextEntry4] : 2:01 PM [FreeTextEntry5] : 2:53 PM [FreeTextEntry1] : Psychotherapy follow-up visit with the treatment diagnoses of 1) (296.90) (F39) Mood disorder, 2) (309.81) (F43.10) PTSD (post-traumatic stress disorder)

## 2024-08-21 NOTE — PLAN
[[facility]] : [facility]  [Motivational Interviewing] : Motivational Interviewing  [Psychoeducation] : Psychoeducation  [Skills training (all types)] : Skills training (all types)  [Supportive Therapy] : Supportive Therapy [FreeTextEntry2] : Treatment Goal (effective as of 5/13/24): Maggie will gain x3 coping skills to regulate emotions adequately to maintain her desired weekly routine.  Objective A. Pt will use x1-3 coping skills daily to regulate thoughts and feelings to complete the desired tasks. Objective B. Pt will review mood-altering events in session every 1-3 weeks to process triggers and coping skill applications to gain self-awareness and insights.  Objective C. Pt will maintain medication management as prescribed daily and attend all scheduled appointments (Medication Management: Every 1-3 months & Individual Therapy: Every 1-3 weeks).  [de-identified] : Maggie was having ongoing conversations with the previous psychiatrist (Dr. Ha) regarding medication treatment but did not start with any medications. Maggie confirmed receiving a voicemail from Dr. Joseph and called him back recently. The patient reported "feeling better" but had an upsetting encounter over the weekend.    We processed two mood-altering events: 1) Openly sharing her needs with the  last week during the session, and 2) Her recent relational discord. The patient encountered positive experience during the conversation with the  where she felt heard. During the challenging encounter with the person, she felt "hatred" toward the person, which brought a sense of shame. We discussed emotions around hatred, primarily anger, including the purpose and benefits of the feelings.   Maggie will continue to practice Self-compassionate self-talk to decrease self-degrading thoughts and feelings and grounding upper body movement to reduce bodily tensions. Also, she will use the STOP skills to observe her thoughts and emotions to identify her triggers and initial emotional reactions. We will have a f/u discussion during the next session.   Action Plan & Practice Task(s) in Progress: - STOP Skills: Stop, Take a step back, Observe, and Proceed mindfully with intention (x1-5, Daily). - Self-compassionate self-talk: Using gentle tone and words in self-talk (x1-5, Daily). - Grounding movements (x1-5, Daily).  Skills Training: - Paced breathing exercises (x1-3, daily)  Recommended services & referrals made: - 4/29/24, Housing support: [employer] and other community resources - 5/20/24:  Social Security Adm for additional resources for financial support - 6/3/24, [facility] Case Management Services: Pt declined and reported having a case management services, Eli Lee (female, [phone]), [facility].  Support Network (established contact consent/PHI Release): - Adrienne López, Friend, [phone] (verbal consent 6/3/24) - Eli Lee,  from [facility], [phone]   Follow-up: - Return in 1 week(s)

## 2024-08-21 NOTE — END OF VISIT
[Duration of Psychotherapy Visit (minutes spent in synchronous communication): ____] : Duration of Psychotherapy Visit (minutes spent in synchronous communication): [unfilled] [Individual Psychotherapy for 53+ minutes] : Individual Psychotherapy for 53+ minutes  [Licensed Clinician] : Licensed Clinician [Individual Psychotherapy for 38-52 minutes] : Individual Psychotherapy for 38 - 52 minutes
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
## 2024-08-21 NOTE — END OF VISIT
Patient:   GRAEME MACIAS            MRN: [id number]            FIN: [id number]               Age:   69 years     Sex:  FEMALE     :  45   Associated Diagnoses:   Anemia   Author:   LEFTY KIRK      Pre-Procedure     Preprocedure diagniosis: Anemia, possible melena    Post procedure diagnosis: Nonerosive gastritis, gastric polyps    Procedure performed: EGD with biopsy    Medications: MAC      Procedure Date:  2018 .    Procedure Type:  Esophagogastroduodenoscopy.     Procedure provider:  Performed by LEFTY KIRK.    Current history and physical:  Documented on chart.    Informed Consent:  After discussing the rationale, risks and benefits, and alternatives to this procedure, the patient provided signed consent for the procedure.    Indication:  Anemia, possible melena.    Medications:   (Selected)   Inpatient Medications  Ordered  0.9% NaCl 1,000 mL: 100 mL/hr, IV  ALPRAZolam oral 0.25 mg tablet: 0.25 mg, 1 tab, Oral, BID  Anucort-HC (Hemorrhoidal HC) rectal 25 mg suppository: 25 mg, 1 suppository, Rectal, Q Bedtime  Effexor XR oral 75 mg capsule: 75 mg, 1 cap, Oral, Daily [after lunch]  LORazepam injection (Ativan): 2 mg, 1 mL, Slow IV Push, Q6H, PRN: seizures  Remeron oral 15 mg tablet: 15 mg, 1 tab, Oral, Q Bedtime  enoxaparin subcutaneous injection (Lovenox): 40 mg, 0.4 mL, Subcutaneous, Daily  ondansetron (Zofran).: 4 mg, 2 mL, Slow IV Push, One Time (unscheduled), PRN: nausea or vomiting  ondansetron injection 2 mg/mL (Zofran): 4 mg, 2 mL, Slow IV Push, Q6H, PRN: nausea or vomiting  Prescriptions  Prescribed  ALPRAZolam oral 0.25 mg tablet: 0.25 mg, 1 tab, Oral, BID, 60 tab, 2 Refill(s)  Anucort-HC (Hemorrhoidal HC) rectal 25 mg suppository: 25 mg, 1 suppository, Rectal, Q Bedtime, 30 suppository, 0 Refill(s)  Effexor XR oral 75 mg capsule: 75 mg, 1 cap, Oral, Daily [after lunch], 30 cap, 1 Refill(s)  Remeron oral 15 mg tablet: 15 mg, 1 tab, Oral, Q Bedtime, 30 tab, 2  [Duration of Psychotherapy Visit (minutes spent in synchronous communication): ____] : Duration of Psychotherapy Visit (minutes spent in synchronous communication): [unfilled] Refill(s).    ASA Classification:  See anesthesia records   .    Monitoring:  See anesthesia record.       Procedure       After the risks, benefits and alternatives of the procedure were thoroughly explained (risks including but not limited to bleeding, infection, reaction to medications, perforation, requiring surgery, damage to face, teeth, jaw, anesthesia related complications,  requiring hospitalization, failure of treatment, need for repeat procedure, pain, disability and death), Informed consent was verified, confirmed and timeout was successfully executed by the treatment team. Medications were administered by anesthesia after positioning the patient.      The scope was then completely withdrawn from the patient and the procedure completed. The pulse, BP, and O2 saturation were monitored and documented by the physician and the nursing staff throughout the entire procedure. The patient was cared for as planned according to standard protocol. The patient was then discharged to recovery in stable condition and with appropriate post procedure care.       The procedure was performed in an endoscopy suite in the hospital.  See anesthesia record for sedation given during procedure.  The patient was positioned starting in the left lateral decubitus position.  Endoscope type used was an adult-size, introduced orally, advanced to 2nd part of duodenum.  No difficulties were encountered during the procedure.  Views were excellent.  The patient tolerated the procedure well.     Findings     Esophagus: Unremarkable    Stomach: Nonerosive gastritis in the antrum and body of the stomach.  Multiple small polyps were seen in the body of the stomach and fundus, likely fundic gland polyps, sampling biopsies obtained    Duodenum: Unremarkable bulb and second part of duodenum      Post-Procedure   Complications encountered during the procedure were none.  Estimated blood loss:  None.    Blood administered:  No.   [Individual Psychotherapy for 53+ minutes] : Individual Psychotherapy for 53+ minutes    Grafts/implants:  None.    Specimens were sent to pathology.     Impression and Plan   EGD:       Diagnosis: Anemia ([id number] D64.9, Diagnosis, Medical).      Impression:    Nonerosive gastritis  Gastric polyps    Recommendations:     Return to floor  Resume diet  Avoid NSAIDs  Follow pathology results  Check folate, vitamin B12 and ferritin levels          Recommendations     As needed.           Education and Follow-up:       Counseled: Patient, Family, On diagnosis, On treatment.      Please forward a copy of report to primary care and referring physicians            Electronically Signed On 04/30/2018 18:12  __________________________________________________   LEFTY KIRK     [Licensed Clinician] : Licensed Clinician [Individual Psychotherapy for 38-52 minutes] : Individual Psychotherapy for 38 - 52 minutes

## 2024-08-26 ENCOUNTER — OUTPATIENT (OUTPATIENT)
Dept: OUTPATIENT SERVICES | Facility: HOSPITAL | Age: 54
LOS: 1 days | End: 2024-08-26
Payer: MEDICAID

## 2024-08-26 ENCOUNTER — APPOINTMENT (OUTPATIENT)
Dept: PSYCHIATRY | Facility: CLINIC | Age: 54
End: 2024-08-26

## 2024-08-26 DIAGNOSIS — F43.10 POST-TRAUMATIC STRESS DISORDER, UNSPECIFIED: ICD-10-CM

## 2024-08-26 DIAGNOSIS — F39 UNSPECIFIED MOOD [AFFECTIVE] DISORDER: ICD-10-CM

## 2024-08-26 PROCEDURE — 90834 PSYTX W PT 45 MINUTES: CPT

## 2024-08-26 NOTE — PLAN
[[facility]] : [facility]  [Motivational Interviewing] : Motivational Interviewing  [Psychoeducation] : Psychoeducation  [Skills training (all types)] : Skills training (all types)  [Supportive Therapy] : Supportive Therapy [FreeTextEntry2] : Treatment Goal (effective as of 5/13/24): Maggie will gain x3 coping skills to regulate emotions adequately to maintain her desired weekly routine.  Objective A. Pt will use x1-3 coping skills daily to regulate thoughts and feelings to complete the desired tasks. Objective B. Pt will review mood-altering events in session every 1-3 weeks to process triggers and coping skill applications to gain self-awareness and insights.  Objective C. Pt will maintain medication management as prescribed daily and attend all scheduled appointments (Medication Management: Every 1-3 months & Individual Therapy: Every 1-3 weeks).  [de-identified] : Maggie was having ongoing conversations with the previous psychiatrist (Dr. Ha) regarding medication treatment but did not start with any medications. Maggie received a voicemail from Dr. Joseph; she called him back recently and is waiting for his return call. The patient reported "feeling better" but was mainly concerned about her upcoming trip to [place] and visiting her parents (primary trigger).    We processed her upcoming trip to [place] and her meeting with the parents using FEAR skills (Feelings, Evaluation, Attitudes/Approaches, and Reward), discussing the most desirable scenario, the worst outcome, and the most likely outcome to prepare & expand her attitudes and approaches.    Maggie will continue to practice Self-compassionate self-talk ("I am in charge of my trip") to decrease self-degrading thoughts and feelings and grounding upper body movement to reduce bodily tensions. Also, she will use the STOP skills to observe and monitor her thoughts and emotions and contact her friend, Brittni, for additional support. We will have a f/u discussion during the next session.   Action Plan & Practice Task(s) in Progress: - STOP Skills: Stop, Take a step back, Observe, and Proceed mindfully with intention (x1-5, Daily). - Self-compassionate self-talk: Using gentle tone and words in self-talk (x1-5, Daily). - Grounding movements (x1-5, Daily).  Skills Training: - Paced breathing exercises (x1-3, daily)  Recommended services & referrals made: - 4/29/24, Housing support: [employer] and other community resources - 5/20/24:  Social Security Adm for additional resources for financial support - 6/3/24, [facility] Case Management Services: Pt declined and reported having a case management services, Eli Lee (female, [phone]), [facility].  Support Network (established contact consent/PHI Release): - Adrienne López, Friend, [phone] (verbal consent 6/3/24) - Eli Lee,  from [facility], [phone]   Follow-up: - Return in 1 week(s)

## 2024-08-26 NOTE — PLAN
[[facility]] : [facility]  [Motivational Interviewing] : Motivational Interviewing  [Psychoeducation] : Psychoeducation  [Skills training (all types)] : Skills training (all types)  [Supportive Therapy] : Supportive Therapy [FreeTextEntry2] : Treatment Goal (effective as of 5/13/24): Maggie will gain x3 coping skills to regulate emotions adequately to maintain her desired weekly routine.  Objective A. Pt will use x1-3 coping skills daily to regulate thoughts and feelings to complete the desired tasks. Objective B. Pt will review mood-altering events in session every 1-3 weeks to process triggers and coping skill applications to gain self-awareness and insights.  Objective C. Pt will maintain medication management as prescribed daily and attend all scheduled appointments (Medication Management: Every 1-3 months & Individual Therapy: Every 1-3 weeks).  [de-identified] : Maggie was having ongoing conversations with the previous psychiatrist (Dr. Ha) regarding medication treatment but did not start with any medications. Maggie received a voicemail from Dr. Joseph; she called him back recently and is waiting for his return call. The patient reported "feeling better" but was mainly concerned about her upcoming trip to [place] and visiting her parents (primary trigger).    We processed her upcoming trip to [place] and her meeting with the parents using FEAR skills (Feelings, Evaluation, Attitudes/Approaches, and Reward), discussing the most desirable scenario, the worst outcome, and the most likely outcome to prepare & expand her attitudes and approaches.    Maggie will continue to practice Self-compassionate self-talk ("I am in charge of my trip") to decrease self-degrading thoughts and feelings and grounding upper body movement to reduce bodily tensions. Also, she will use the STOP skills to observe and monitor her thoughts and emotions and contact her friend, Brittni, for additional support. We will have a f/u discussion during the next session.   Action Plan & Practice Task(s) in Progress: - STOP Skills: Stop, Take a step back, Observe, and Proceed mindfully with intention (x1-5, Daily). - Self-compassionate self-talk: Using gentle tone and words in self-talk (x1-5, Daily). - Grounding movements (x1-5, Daily).  Skills Training: - Paced breathing exercises (x1-3, daily)  Recommended services & referrals made: - 4/29/24, Housing support: [employer] and other community resources - 5/20/24:  Social Security Adm for additional resources for financial support - 6/3/24, [facility] Case Management Services: Pt declined and reported having a case management services, Eli Lee (female, [phone]), [facility].  Support Network (established contact consent/PHI Release): - Adrienne López, Friend, [phone] (verbal consent 6/3/24) - Eli Lee,  from [facility], [phone]   Follow-up: - Return in 1 week(s)

## 2024-08-26 NOTE — REASON FOR VISIT
[Patient preference] : as per patient preference [Continuity of care] : to ensure continuity of care [Telehealth (audio & video) - Individual/Group] : This visit was provided via telehealth using real-time 2-way audio visual technology. [Other Location: e.g. Home (Enter Location, City,State)___] : The provider was located at [unfilled]. [Home] : The patient, [unfilled], was located at home, [unfilled], at the time of the visit. [Verbal consent obtained from patient/other participant(s)] : Verbal consent for telehealth/telephonic services obtained from patient/other participant(s) [Patient] : Patient [FreeTextEntry4] : 2:01 PM [FreeTextEntry5] : 2:50 PM [FreeTextEntry1] : Psychotherapy follow-up visit with the treatment diagnoses of 1) (296.90) (F39) Mood disorder, 2) (309.81) (F43.10) PTSD (post-traumatic stress disorder)

## 2024-08-27 DIAGNOSIS — F43.10 POST-TRAUMATIC STRESS DISORDER, UNSPECIFIED: ICD-10-CM

## 2024-08-27 DIAGNOSIS — F39 UNSPECIFIED MOOD [AFFECTIVE] DISORDER: ICD-10-CM

## 2024-08-29 ENCOUNTER — NON-APPOINTMENT (OUTPATIENT)
Age: 54
End: 2024-08-29

## 2024-08-29 NOTE — DISCUSSION/SUMMARY
[FreeTextEntry1] : Called patient at listed phone number [phone] re: scheduling appointment with new provider after transition of care. Patient states she can come in Tues Sept 10th at 3PM.

## 2024-09-09 ENCOUNTER — APPOINTMENT (OUTPATIENT)
Dept: PSYCHIATRY | Facility: CLINIC | Age: 54
End: 2024-09-09

## 2024-09-09 ENCOUNTER — OUTPATIENT (OUTPATIENT)
Dept: OUTPATIENT SERVICES | Facility: HOSPITAL | Age: 54
LOS: 1 days | End: 2024-09-09
Payer: MEDICAID

## 2024-09-09 DIAGNOSIS — F43.10 POST-TRAUMATIC STRESS DISORDER, UNSPECIFIED: ICD-10-CM

## 2024-09-09 DIAGNOSIS — F39 UNSPECIFIED MOOD [AFFECTIVE] DISORDER: ICD-10-CM

## 2024-09-09 PROCEDURE — 90834 PSYTX W PT 45 MINUTES: CPT

## 2024-09-10 ENCOUNTER — OUTPATIENT (OUTPATIENT)
Dept: OUTPATIENT SERVICES | Facility: HOSPITAL | Age: 54
LOS: 1 days | End: 2024-09-10
Payer: MEDICAID

## 2024-09-10 ENCOUNTER — APPOINTMENT (OUTPATIENT)
Dept: PSYCHIATRY | Facility: CLINIC | Age: 54
End: 2024-09-10
Payer: MEDICAID

## 2024-09-10 DIAGNOSIS — F39 UNSPECIFIED MOOD [AFFECTIVE] DISORDER: ICD-10-CM

## 2024-09-10 DIAGNOSIS — F43.10 POST-TRAUMATIC STRESS DISORDER, UNSPECIFIED: ICD-10-CM

## 2024-09-10 DIAGNOSIS — F41.9 ANXIETY DISORDER, UNSPECIFIED: ICD-10-CM

## 2024-09-10 DIAGNOSIS — F32.A DEPRESSION, UNSPECIFIED: ICD-10-CM

## 2024-09-10 PROCEDURE — 99214 OFFICE O/P EST MOD 30 MIN: CPT

## 2024-09-10 PROCEDURE — ZZZZZ: CPT

## 2024-09-10 RX ORDER — BUPROPION HYDROCHLORIDE 75 MG/1
75 TABLET, FILM COATED ORAL
Qty: 30 | Refills: 0 | Status: ACTIVE | COMMUNITY
Start: 2024-09-10 | End: 1900-01-01

## 2024-09-10 NOTE — PLAN
[[facility]] : [facility]  [Motivational Interviewing] : Motivational Interviewing  [Psychoeducation] : Psychoeducation  [Skills training (all types)] : Skills training (all types)  [Supportive Therapy] : Supportive Therapy [FreeTextEntry2] : Treatment Goal (effective as of 5/13/24): Maggie will gain x3 coping skills to regulate emotions adequately to maintain her desired weekly routine.  Objective A. Pt will use x1-3 coping skills daily to regulate thoughts and feelings to complete the desired tasks. Objective B. Pt will review mood-altering events in session every 1-3 weeks to process triggers and coping skill applications to gain self-awareness and insights.  Objective C. Pt will maintain medication management as prescribed daily and attend all scheduled appointments (Medication Management: Every 1-3 months & Individual Therapy: Every 1-3 weeks).  [de-identified] : Maggie was having ongoing conversations with the previous psychiatrist (Dr. Ha) regarding medication treatment but did not start with any medications. Maggie confirmed scheduling the first appointment with the new psychiatrist, Dr. Joseph, this week. The patient reported "feeling like a failure" after not taking her trip to [place] for various reasons and receiving money from her mother to fix her car. We primarily discussed her reasons for postponing her trip to [facility], including her self-degrading automatic thoughts and physical sensations, which mainly reflect a sense of shame.   Maggie will continue to practice self-compassionate self-talk to acknowledge all her accomplishments and efforts to improve herself and her situation. Also, she will use the STOP skills to observe and redirect her thoughts and manage emotions and, think about specific tasks & plan to improve her house environment. We will have a f/u discussion during the next session.   Action Plan & Practice Task(s) in Progress: - STOP Skills: Stop, Take a step back, Observe, and Proceed mindfully with intention (x1-5, Daily). - Self-compassionate self-talk: Using gentle tone and words in self-talk (x1-5, Daily). - Grounding movements (x1-5, Daily).  Skills Training: - Paced breathing exercises  - STOP Skills: Stop, Take a step back, Observe, and Proceed mindfully with intention  - Self-compassionate self-talk: Using gentle tone and words in self-talk  - Grounding movements   Recommended services & referrals made: - 4/29/24, Housing support: [employer] and other community resources - 5/20/24:  Social Security Adm for additional resources for financial support - 6/3/24, [facility] Case Management Services: Pt declined and reported having a case management services, Eli Lee (female, [phone]), [facility].  Support Network (established contact consent/PHI Release): - Adrienne López, Friend, [phone] (verbal consent 6/3/24) - Eli Lee,  from [facility], [phone]   Follow-up: - Return in 1 week(s)

## 2024-09-10 NOTE — PHYSICAL EXAM
[Cooperative] : cooperative [Full] : full [Clear] : clear [Linear/Goal Directed] : linear/goal directed [None] : none [None Reported] : none reported [Average] : average [WNL] : within normal limits [Depressed] : depressed [Anxious] : anxious [FreeTextEntry1] : some soiling on clothes [de-identified] : Good [de-identified] : Good

## 2024-09-10 NOTE — PLAN
[No] : No [Medication education provided] : Medication education provided. [FreeTextEntry5] : - start Wellbutrin 75mg IR PO daily for depression and anxiety - patient reports previous side effects on SSRIs (Prozac, Lexapro, Celexa, Zoloft) and does not want to medications of this class - patient to email list of amino acid supplements she is taking  - patient to f/u with PCP/pulmonlogist about sleep apnea - ordered  labs (CMP, lipids, a1c, thyroid), review at next visit - c/w psychotherapy with Elaina - Follow up in 1 month (Oct 10th 230PM)
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
## 2024-09-10 NOTE — REASON FOR VISIT
[Patient preference] : as per patient preference [Continuity of care] : to ensure continuity of care [Telehealth (audio & video) - Individual/Group] : This visit was provided via telehealth using real-time 2-way audio visual technology. [Other Location: e.g. Home (Enter Location, City,State)___] : The provider was located at [unfilled]. [Home] : The patient, [unfilled], was located at home, [unfilled], at the time of the visit. [Verbal consent obtained from patient/other participant(s)] : Verbal consent for telehealth/telephonic services obtained from patient/other participant(s) [Patient] : Patient [Access issues (e.g., transportation, impaired mobility, etc.)] : due to patient's access issues [FreeTextEntry4] : 2:01 PM [FreeTextEntry5] : 2:53 PM [FreeTextEntry1] : Psychotherapy follow-up visit with the treatment diagnoses of  1) (296.90) (F39) Mood disorder,  2) (309.81) (F43.10) PTSD (post-traumatic stress disorder).

## 2024-09-10 NOTE — PLAN
[[facility]] : [facility]  [Motivational Interviewing] : Motivational Interviewing  [Psychoeducation] : Psychoeducation  [Skills training (all types)] : Skills training (all types)  [Supportive Therapy] : Supportive Therapy [FreeTextEntry2] : Treatment Goal (effective as of 5/13/24): Maggie will gain x3 coping skills to regulate emotions adequately to maintain her desired weekly routine.  Objective A. Pt will use x1-3 coping skills daily to regulate thoughts and feelings to complete the desired tasks. Objective B. Pt will review mood-altering events in session every 1-3 weeks to process triggers and coping skill applications to gain self-awareness and insights.  Objective C. Pt will maintain medication management as prescribed daily and attend all scheduled appointments (Medication Management: Every 1-3 months & Individual Therapy: Every 1-3 weeks).  [de-identified] : Maggie was having ongoing conversations with the previous psychiatrist (Dr. Ha) regarding medication treatment but did not start with any medications. Maggie confirmed scheduling the first appointment with the new psychiatrist, Dr. Joseph, this week. The patient reported "feeling like a failure" after not taking her trip to [place] for various reasons and receiving money from her mother to fix her car. We primarily discussed her reasons for postponing her trip to [facility], including her self-degrading automatic thoughts and physical sensations, which mainly reflect a sense of shame.   Maggie will continue to practice self-compassionate self-talk to acknowledge all her accomplishments and efforts to improve herself and her situation. Also, she will use the STOP skills to observe and redirect her thoughts and manage emotions and, think about specific tasks & plan to improve her house environment. We will have a f/u discussion during the next session.   Action Plan & Practice Task(s) in Progress: - STOP Skills: Stop, Take a step back, Observe, and Proceed mindfully with intention (x1-5, Daily). - Self-compassionate self-talk: Using gentle tone and words in self-talk (x1-5, Daily). - Grounding movements (x1-5, Daily).  Skills Training: - Paced breathing exercises  - STOP Skills: Stop, Take a step back, Observe, and Proceed mindfully with intention  - Self-compassionate self-talk: Using gentle tone and words in self-talk  - Grounding movements   Recommended services & referrals made: - 4/29/24, Housing support: [employer] and other community resources - 5/20/24:  Social Security Adm for additional resources for financial support - 6/3/24, [facility] Case Management Services: Pt declined and reported having a case management services, Eli Lee (female, [phone]), [facility].  Support Network (established contact consent/PHI Release): - Adrienne López, Friend, [phone] (verbal consent 6/3/24) - Eli Lee,  from [facility], [phone]   Follow-up: - Return in 1 week(s)

## 2024-09-10 NOTE — PLAN
[[facility]] : [facility]  [Motivational Interviewing] : Motivational Interviewing  [Psychoeducation] : Psychoeducation  [Skills training (all types)] : Skills training (all types)  [Supportive Therapy] : Supportive Therapy [FreeTextEntry2] : Treatment Goal (effective as of 5/13/24): Maggie will gain x3 coping skills to regulate emotions adequately to maintain her desired weekly routine.  Objective A. Pt will use x1-3 coping skills daily to regulate thoughts and feelings to complete the desired tasks. Objective B. Pt will review mood-altering events in session every 1-3 weeks to process triggers and coping skill applications to gain self-awareness and insights.  Objective C. Pt will maintain medication management as prescribed daily and attend all scheduled appointments (Medication Management: Every 1-3 months & Individual Therapy: Every 1-3 weeks).  [de-identified] : Maggie was having ongoing conversations with the previous psychiatrist (Dr. Ha) regarding medication treatment but did not start with any medications. Maggie confirmed scheduling the first appointment with the new psychiatrist, Dr. Joseph, this week. The patient reported "feeling like a failure" after not taking her trip to [place] for various reasons and receiving money from her mother to fix her car. We primarily discussed her reasons for postponing her trip to [facility], including her self-degrading automatic thoughts and physical sensations, which mainly reflect a sense of shame.   Maggie will continue to practice self-compassionate self-talk to acknowledge all her accomplishments and efforts to improve herself and her situation. Also, she will use the STOP skills to observe and redirect her thoughts and manage emotions and, think about specific tasks & plan to improve her house environment. We will have a f/u discussion during the next session.   Action Plan & Practice Task(s) in Progress: - STOP Skills: Stop, Take a step back, Observe, and Proceed mindfully with intention (x1-5, Daily). - Self-compassionate self-talk: Using gentle tone and words in self-talk (x1-5, Daily). - Grounding movements (x1-5, Daily).  Skills Training: - Paced breathing exercises  - STOP Skills: Stop, Take a step back, Observe, and Proceed mindfully with intention  - Self-compassionate self-talk: Using gentle tone and words in self-talk  - Grounding movements   Recommended services & referrals made: - 4/29/24, Housing support: [employer] and other community resources - 5/20/24:  Social Security Adm for additional resources for financial support - 6/3/24, [facility] Case Management Services: Pt declined and reported having a case management services, Eli Lee (female, [phone]), [facility].  Support Network (established contact consent/PHI Release): - Adrienne López, Friend, [phone] (verbal consent 6/3/24) - Eli Lee,  from [facility], [phone]   Follow-up: - Return in 1 week(s)

## 2024-09-11 DIAGNOSIS — F39 UNSPECIFIED MOOD [AFFECTIVE] DISORDER: ICD-10-CM

## 2024-09-11 DIAGNOSIS — F43.10 POST-TRAUMATIC STRESS DISORDER, UNSPECIFIED: ICD-10-CM

## 2024-09-16 ENCOUNTER — OUTPATIENT (OUTPATIENT)
Dept: OUTPATIENT SERVICES | Facility: HOSPITAL | Age: 54
LOS: 1 days | End: 2024-09-16
Payer: MEDICAID

## 2024-09-16 ENCOUNTER — APPOINTMENT (OUTPATIENT)
Dept: PSYCHIATRY | Facility: CLINIC | Age: 54
End: 2024-09-16

## 2024-09-16 DIAGNOSIS — F39 UNSPECIFIED MOOD [AFFECTIVE] DISORDER: ICD-10-CM

## 2024-09-16 DIAGNOSIS — F43.10 POST-TRAUMATIC STRESS DISORDER, UNSPECIFIED: ICD-10-CM

## 2024-09-16 PROCEDURE — 90837 PSYTX W PT 60 MINUTES: CPT

## 2024-09-17 DIAGNOSIS — F43.10 POST-TRAUMATIC STRESS DISORDER, UNSPECIFIED: ICD-10-CM

## 2024-09-17 DIAGNOSIS — F39 UNSPECIFIED MOOD [AFFECTIVE] DISORDER: ICD-10-CM

## 2024-09-17 NOTE — REASON FOR VISIT
[Patient preference] : as per patient preference [Access issues (e.g., transportation, impaired mobility, etc.)] : due to patient's access issues [Telehealth (audio & video) - Individual/Group] : This visit was provided via telehealth using real-time 2-way audio visual technology. [Other Location: e.g. Home (Enter Location, City,State)___] : The provider was located at [unfilled]. [Home] : The patient, [unfilled], was located at home, [unfilled], at the time of the visit. [Verbal consent obtained from patient/other participant(s)] : Verbal consent for telehealth/telephonic services obtained from patient/other participant(s) [Patient] : Patient [FreeTextEntry4] : 2:01 PM [FreeTextEntry5] : 3:01 PM [FreeTextEntry1] : Psychotherapy follow-up visit with the treatment diagnoses of  1) (296.90) (F39) Mood disorder,  2) (309.81) (F43.10) PTSD (post-traumatic stress disorder).
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
## 2024-09-17 NOTE — RISK ASSESSMENT
[Yes, patient reports ideation or behavior] : Yes, patient reports ideation or behavior [No, patient denies ideation or behavior] : No, patient denies ideation or behavior [Low acute suicide risk] : Low acute suicide risk [No] : No [Yes] : Safety Plan completed/updated (for individuals at risk): Yes [FreeTextEntry9] : Pt reported having sporadic fleeting passive suicidal thoughts with the last experience on 7/8/24. The patient stated, "I will not end my life because of my cats." She reported, "I will go to ER if I begin to have thoughts about ending my life." [FreeTextEntry3] : Weekly individual therapy session.

## 2024-09-23 ENCOUNTER — OUTPATIENT (OUTPATIENT)
Dept: OUTPATIENT SERVICES | Facility: HOSPITAL | Age: 54
LOS: 1 days | End: 2024-09-23
Payer: MEDICAID

## 2024-09-23 ENCOUNTER — APPOINTMENT (OUTPATIENT)
Dept: PSYCHIATRY | Facility: CLINIC | Age: 54
End: 2024-09-23

## 2024-09-23 DIAGNOSIS — F39 UNSPECIFIED MOOD [AFFECTIVE] DISORDER: ICD-10-CM

## 2024-09-23 DIAGNOSIS — F43.10 POST-TRAUMATIC STRESS DISORDER, UNSPECIFIED: ICD-10-CM

## 2024-09-23 PROCEDURE — 90834 PSYTX W PT 45 MINUTES: CPT

## 2024-09-23 NOTE — END OF VISIT
Duration of Psychotherapy Visit (minutes spent in synchronous communication): ____] : Duration of Psychotherapy Visit (minutes spent in synchronous communication): [unfilled] [Individual Psychotherapy for 38-52 minutes] : Individual Psychotherapy for 38 - 52 minutes [Licensed Clinician] : Licensed Clinician

## 2024-09-24 DIAGNOSIS — F39 UNSPECIFIED MOOD [AFFECTIVE] DISORDER: ICD-10-CM

## 2024-09-24 NOTE — RISK ASSESSMENT
Anticipated Discharge Disposition: Likely home with Abx    Action: Pt discussed during IDR. Pt is currently being treated on ABx, outside cultures are pending. Pt 6 clicks score is 21. Pt is anticipated to dc home with no SS needs. Due to Abx plan pending, pt may dc with home Abx.    Barriers to Discharge: ABx plan pending    Plan: continue to follow as needed     [Yes, patient reports ideation or behavior] : Yes, patient reports ideation or behavior [No, patient denies ideation or behavior] : No, patient denies ideation or behavior [Low acute suicide risk] : Low acute suicide risk [No] : No [Yes] : Safety Plan completed/updated (for individuals at risk): Yes [FreeTextEntry9] : Pt reported having sporadic fleeting passive suicidal thoughts with the last experience on 7/8/24. The patient stated, "I will not end my life because of my cats." She reported, "I will go to ER if I begin to have thoughts about ending my life." [FreeTextEntry3] : Weekly individual therapy session.
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
## 2024-09-24 NOTE — PLAN
[[facility]] : [facility]  [Motivational Interviewing] : Motivational Interviewing  [Psychoeducation] : Psychoeducation  [Skills training (all types)] : Skills training (all types)  [Supportive Therapy] : Supportive Therapy [FreeTextEntry2] : Treatment Goal (effective as of 5/13/24): Maggie will gain x3 coping skills to regulate emotions adequately to maintain her desired weekly routine.  Objective A. Pt will use x1-3 coping skills daily to regulate thoughts and feelings to complete the desired tasks. Objective B. Pt will review mood-altering events in session every 1-3 weeks to process triggers and coping skill applications to gain self-awareness and insights.  Objective C. Pt will maintain medication management as prescribed daily and attend all scheduled appointments (Medication Management: Every 1-3 months & Individual Therapy: Every 1-3 weeks).  [de-identified] : Maggie confirmed taking daily medication management as prescribed and attending all scheduled appointments (Medication Management: Every 1-3 months & Individual Therapy: Every 1-3 weeks). The patient stated, "I am concerned that I might have schizophrenia symptoms." The therapist supported the patient in openly discussing her experiences and concerns. She primarily spoke about hx of a/v/h (i.e., hearing a girl's screaming, seeing handwriting or drawings on the walls and ceilings, and seeing a person's thoughts, etc.). The patient sporadically uses cannabis, but she experienced the symptoms when not using it. The therapist informed Dr. Joseph about the patient's concerns and encouraged the patient to discuss her problems as well openly with her psychiatrist. We will further explore in the next session about her concerning symptoms, and she denied acute safety. Also, she shared persistent difficulty with self-care, especially since her house was getting more cluttered and disorganized. The therapist shared APS information, and the patient was receptive to the referral. Maggie will continue to monitor her thoughts and redirect self-degrading thoughts, validating her efforts and accomplishments.    Action Plan & Practice Task(s) in Progress: - STOP Skills: Stop, Take a step back, Observe, and Proceed mindfully with intention (x1-5, Daily). Self-compassionate self-talk: Use gentle tone and words in self-talk to validate all her efforts and accomplishments (x1-5, Daily). - Grounding movements (x1-5, Daily). - Monitor self-degrading thoughts and replace them with facts.  Skills Training: - Paced breathing exercises  - STOP Skills: Stop, Take a step back, Observe, and Proceed mindfully with intention  - Self-compassionate self-talk: Using gentle tone and words in self-talk  - Grounding movements   Recommended services & referrals made: - 4/29/24, Housing support: [employer] and other community resources - 5/20/24:  Social Security Adm for additional resources for financial support - 6/3/24, [facility] Case Management Services: Pt declined and reported having a case management services, Eli Lee (female, [phone]), [facility]. - 9/23/24: APS referral regarding Pt's concerns limited ability to take care her home environment, Pt was receptive.  Support Network (established contact consent/PHI Release): - Adrienne López, Friend, [phone] (verbal consent 6/3/24) - Eli Lee,  from [facility], [phone]   Follow-up: - Return in 1 week(s)

## 2024-09-24 NOTE — PLAN
[[facility]] : [facility]  [Motivational Interviewing] : Motivational Interviewing  [Skills training (all types)] : Skills training (all types)  [Psychoeducation] : Psychoeducation  [Supportive Therapy] : Supportive Therapy [FreeTextEntry2] : Treatment Goal (effective as of 5/13/24): Maggie will gain x3 coping skills to regulate emotions adequately to maintain her desired weekly routine.  Objective A. Pt will use x1-3 coping skills daily to regulate thoughts and feelings to complete the desired tasks. Objective B. Pt will review mood-altering events in session every 1-3 weeks to process triggers and coping skill applications to gain self-awareness and insights.  Objective C. Pt will maintain medication management as prescribed daily and attend all scheduled appointments (Medication Management: Every 1-3 months & Individual Therapy: Every 1-3 weeks).  [de-identified] : Maggie confirmed taking daily medication management as prescribed and attending all scheduled appointments (Medication Management: Every 1-3 months & Individual Therapy: Every 1-3 weeks). The patient stated, "I am concerned that I might have schizophrenia symptoms." The therapist supported the patient in openly discussing her experiences and concerns. She primarily spoke about hx of a/v/h (i.e., hearing a girl's screaming, seeing handwriting or drawings on the walls and ceilings, and seeing a person's thoughts, etc.). The patient sporadically uses cannabis, but she experienced the symptoms when not using it. The therapist informed Dr. Joseph about the patient's concerns and encouraged the patient to discuss her problems as well openly with her psychiatrist. We will further explore in the next session about her concerning symptoms, and she denied acute safety. Also, she shared persistent difficulty with self-care, especially since her house was getting more cluttered and disorganized. The therapist shared APS information, and the patient was receptive to the referral. Maggie will continue to monitor her thoughts and redirect self-degrading thoughts, validating her efforts and accomplishments.    Action Plan & Practice Task(s) in Progress: - STOP Skills: Stop, Take a step back, Observe, and Proceed mindfully with intention (x1-5, Daily). Self-compassionate self-talk: Use gentle tone and words in self-talk to validate all her efforts and accomplishments (x1-5, Daily). - Grounding movements (x1-5, Daily). - Monitor self-degrading thoughts and replace them with facts.  Skills Training: - Paced breathing exercises  - STOP Skills: Stop, Take a step back, Observe, and Proceed mindfully with intention  - Self-compassionate self-talk: Using gentle tone and words in self-talk  - Grounding movements   Recommended services & referrals made: - 4/29/24, Housing support: [employer] and other community resources - 5/20/24:  Social Security Adm for additional resources for financial support - 6/3/24, [facility] Case Management Services: Pt declined and reported having a case management services, Eli Lee (female, [phone]), [facility]. - 9/23/24: APS referral regarding Pt's concerns limited ability to take care her home environment, Pt was receptive.  Support Network (established contact consent/PHI Release): - Adrienne López, Friend, [phone] (verbal consent 6/3/24) - Eli Lee,  from [facility], [phone]   Follow-up: - Return in 1 week(s)

## 2024-09-24 NOTE — REASON FOR VISIT
[Patient preference] : as per patient preference [Access issues (e.g., transportation, impaired mobility, etc.)] : due to patient's access issues [Telehealth (audio & video) - Individual/Group] : This visit was provided via telehealth using real-time 2-way audio visual technology. [Other Location: e.g. Home (Enter Location, City,State)___] : The provider was located at [unfilled]. [Home] : The patient, [unfilled], was located at home, [unfilled], at the time of the visit. [Verbal consent obtained from patient/other participant(s)] : Verbal consent for telehealth/telephonic services obtained from patient/other participant(s) [Patient] : Patient [FreeTextEntry4] : 2:01 PM [FreeTextEntry5] : 2:54 PM [FreeTextEntry1] : Psychotherapy follow-up visit with the treatment diagnoses of  1) (296.90) (F39) Mood disorder,  2) (309.81) (F43.10) PTSD (post-traumatic stress disorder).

## 2024-09-30 ENCOUNTER — APPOINTMENT (OUTPATIENT)
Dept: PSYCHIATRY | Facility: CLINIC | Age: 54
End: 2024-09-30

## 2024-09-30 ENCOUNTER — OUTPATIENT (OUTPATIENT)
Dept: OUTPATIENT SERVICES | Facility: HOSPITAL | Age: 54
LOS: 1 days | End: 2024-09-30

## 2024-09-30 ENCOUNTER — NON-APPOINTMENT (OUTPATIENT)
Age: 54
End: 2024-09-30

## 2024-09-30 DIAGNOSIS — F39 UNSPECIFIED MOOD [AFFECTIVE] DISORDER: ICD-10-CM

## 2024-09-30 NOTE — REASON FOR VISIT
[Patient preference] : as per patient preference [Access issues (e.g., transportation, impaired mobility, etc.)] : due to patient's access issues [Telehealth (audio & video) - Individual/Group] : This visit was provided via telehealth using real-time 2-way audio visual technology. [Other Location: e.g. Home (Enter Location, City,State)___] : The provider was located at [unfilled]. [Verbal consent obtained from patient/other participant(s)] : Verbal consent for telehealth/telephonic services obtained from patient/other participant(s) [Home] : The patient, [unfilled], was located at home, [unfilled], at the time of the visit. [Patient] : Patient [FreeTextEntry4] : 2:00 PM [FreeTextEntry5] : 2:52 PM [FreeTextEntry1] : Psychotherapy follow-up visit with the treatment diagnoses of  1) (296.90) (F39) Mood disorder,  2) (309.81) (F43.10) PTSD (post-traumatic stress disorder).

## 2024-09-30 NOTE — REASON FOR VISIT
[Patient preference] : as per patient preference [Access issues (e.g., transportation, impaired mobility, etc.)] : due to patient's access issues [Telehealth (audio & video) - Individual/Group] : This visit was provided via telehealth using real-time 2-way audio visual technology. [Other Location: e.g. Home (Enter Location, City,State)___] : The provider was located at [unfilled]. [Home] : The patient, [unfilled], was located at home, [unfilled], at the time of the visit. [Verbal consent obtained from patient/other participant(s)] : Verbal consent for telehealth/telephonic services obtained from patient/other participant(s) [Patient] : Patient [FreeTextEntry4] : 2:00 PM [FreeTextEntry5] : 2:52 PM [FreeTextEntry1] : Psychotherapy follow-up visit with the treatment diagnoses of  1) (296.90) (F39) Mood disorder,  2) (309.81) (F43.10) PTSD (post-traumatic stress disorder).

## 2024-09-30 NOTE — DISCUSSION/SUMMARY
[FreeTextEntry1] : The therapist made an APS referral due to the patient's concerns about her difficulty decluttering her apartment after a months-long lack of motivation to deal with depression symptoms, which negatively impacted her mood, and to connect her to other available community resources. The referral was made online on 9/30/24 with the referral confirmation number [id number] ([phone]). Refer to the scanned copy of the referral.

## 2024-09-30 NOTE — PLAN
[[facility]] : [facility]  [Motivational Interviewing] : Motivational Interviewing  [Psychoeducation] : Psychoeducation  [Skills training (all types)] : Skills training (all types)  [Supportive Therapy] : Supportive Therapy [FreeTextEntry2] : Treatment Goal (effective as of 5/13/24): Maggie will gain x3 coping skills to regulate emotions adequately to maintain her desired weekly routine.  Objective A. Pt will use x1-3 coping skills daily to regulate thoughts and feelings to complete the desired tasks. Objective B. Pt will review mood-altering events in session every 1-3 weeks to process triggers and coping skill applications to gain self-awareness and insights.  Objective C. Pt will maintain medication management as prescribed daily and attend all scheduled appointments (Medication Management: Every 1-3 months & Individual Therapy: Every 1-3 weeks).  [de-identified] : Maggie confirmed taking daily medication management as prescribed and attending all scheduled appointments (Medication Management: Every 1-3 months & Individual Therapy: Every 1-3 weeks). The patient reported having a rough week since our previous session due to her financial-related issues, which negatively impacted her mood. She shared with the therapist her steps of redirecting her negative thoughts using her self-talk and considering all of her choices of options to reframe her situations. The therapist supported the patient in openly discussing her recent events and coping skills applications using validation and reflective listening. Maggie will use STOP skills and Grounding movement exercises to focus on her priority responsibilities, solve her financial situation, and plan her possible relocation.    Action Plan & Practice Task(s) in Progress: - STOP Skills: Stop, Take a step back, Observe, and Proceed mindfully with intention (x1-5, Daily). Self-compassionate self-talk: Use gentle tone and words in self-talk to validate all her efforts and accomplishments (x1-5, Daily). - Grounding movements (x1-5, Daily). - Monitor self-degrading thoughts and replace them with facts.  Skills Training: - Paced breathing exercises  - STOP Skills: Stop, Take a step back, Observe, and Proceed mindfully with intention  - Self-compassionate self-talk: Using gentle tone and words in self-talk  - Grounding movements   Recommended services & referrals made: - 4/29/24, Housing support: [employer] and other community resources - 5/20/24:  Social Security Adm for additional resources for financial support - 6/3/24, [facility] Case Management Services: Pt declined and reported having a case management services, Eli Lee (female, [phone]), [facility]. - 9/23/24: APS referral regarding Pt's concerns limited ability to take care her home environment, Pt was receptive.  Support Network (established contact consent/PHI Release): - Adrienne López, Friend, [phone] (verbal consent 6/3/24) - Eli Lee,  from [facility], [phone]   Follow-up: - Return in 1 week(s)

## 2024-09-30 NOTE — PLAN
[[facility]] : [facility]  [Motivational Interviewing] : Motivational Interviewing  [Psychoeducation] : Psychoeducation  [Skills training (all types)] : Skills training (all types)  [[facility]ive Therapy] : Supportive Therapy [FreeTextEntry2] : Treatment Goal (effective as of 5/13/24): Maggie will gain x3 coping skills to regulate emotions adequately to maintain her desired weekly routine.  Objective A. Pt will use x1-3 coping skills daily to regulate thoughts and feelings to complete the desired tasks. Objective B. Pt will review mood-altering events in session every 1-3 weeks to process triggers and coping skill applications to gain self-awareness and insights.  Objective C. Pt will maintain medication management as prescribed daily and attend all scheduled appointments (Medication Management: Every 1-3 months & Individual Therapy: Every 1-3 weeks).  [de-identified] : Maggie confirmed taking daily medication management as prescribed and attending all scheduled appointments (Medication Management: Every 1-3 months & Individual Therapy: Every 1-3 weeks). The patient reported having a rough week since our previous session due to her financial-related issues, which negatively impacted her mood. She shared with the therapist her steps of redirecting her negative thoughts using her self-talk and considering all of her choices of options to reframe her situations. The therapist supported the patient in openly discussing her recent events and coping skills applications using validation and reflective listening. Maggie will use STOP skills and Grounding movement exercises to focus on her priority responsibilities, solve her financial situation, and plan her possible relocation.    Action Plan & Practice Task(s) in Progress: - STOP Skills: Stop, Take a step back, Observe, and Proceed mindfully with intention (x1-5, Daily). Self-compassionate self-talk: Use gentle tone and words in self-talk to validate all her efforts and accomplishments (x1-5, Daily). - Grounding movements (x1-5, Daily). - Monitor self-degrading thoughts and replace them with facts.  Skills Training: - Paced breathing exercises  - STOP Skills: Stop, Take a step back, Observe, and Proceed mindfully with intention  - Self-compassionate self-talk: Using gentle tone and words in self-talk  - Grounding movements   Recommended services & referrals made: - 4/29/24, Housing support: [employer] and other community resources - 5/20/24:  Social Security Adm for additional resources for financial support - 6/3/24, [facility] Case Management Services: Pt declined and reported having a case management services, Eli Lee (female, [phone]), [facility]. - 9/23/24: APS referral regarding Pt's concerns limited ability to take care her home environment, Pt was receptive.  Support Network (established contact consent/PHI Release): - Adrienne López, Friend, [phone] (verbal consent 6/3/24) - Eli Lee,  from [facility], [phone]   Follow-up: - Return in 1 week(s)

## 2024-09-30 NOTE — DISCUSSION/SUMMARY
[FreeTextEntry1] : The therapist made an APS referral due to the patient's concerns about her difficulty decluttering her apartment after a months-long lack of motivation to deal with depression symptoms, which negatively impacted her mood, and to connect her to other available community resources. The referral was made online on 9/30/24 with the referral confirmation number [id number] ([phone]). Refer to the scanned copy of the referral.

## 2024-10-01 DIAGNOSIS — F39 UNSPECIFIED MOOD [AFFECTIVE] DISORDER: ICD-10-CM

## 2024-10-03 ENCOUNTER — NON-APPOINTMENT (OUTPATIENT)
Age: 54
End: 2024-10-03

## 2024-10-07 ENCOUNTER — OUTPATIENT (OUTPATIENT)
Dept: OUTPATIENT SERVICES | Facility: HOSPITAL | Age: 54
LOS: 1 days | End: 2024-10-07
Payer: MEDICAID

## 2024-10-07 ENCOUNTER — NON-APPOINTMENT (OUTPATIENT)
Age: 54
End: 2024-10-07

## 2024-10-07 ENCOUNTER — APPOINTMENT (OUTPATIENT)
Dept: PSYCHIATRY | Facility: CLINIC | Age: 54
End: 2024-10-07

## 2024-10-07 DIAGNOSIS — F43.10 POST-TRAUMATIC STRESS DISORDER, UNSPECIFIED: ICD-10-CM

## 2024-10-07 DIAGNOSIS — F39 UNSPECIFIED MOOD [AFFECTIVE] DISORDER: ICD-10-CM

## 2024-10-07 PROCEDURE — 90834 PSYTX W PT 45 MINUTES: CPT

## 2024-10-08 DIAGNOSIS — F39 UNSPECIFIED MOOD [AFFECTIVE] DISORDER: ICD-10-CM

## 2024-10-10 ENCOUNTER — APPOINTMENT (OUTPATIENT)
Dept: PSYCHIATRY | Facility: CLINIC | Age: 54
End: 2024-10-10
Payer: MEDICAID

## 2024-10-10 ENCOUNTER — OUTPATIENT (OUTPATIENT)
Dept: OUTPATIENT SERVICES | Facility: HOSPITAL | Age: 54
LOS: 1 days | End: 2024-10-10
Payer: MEDICAID

## 2024-10-10 DIAGNOSIS — F41.9 ANXIETY DISORDER, UNSPECIFIED: ICD-10-CM

## 2024-10-10 DIAGNOSIS — F43.10 POST-TRAUMATIC STRESS DISORDER, UNSPECIFIED: ICD-10-CM

## 2024-10-10 DIAGNOSIS — F32.A DEPRESSION, UNSPECIFIED: ICD-10-CM

## 2024-10-10 PROCEDURE — 99213 OFFICE O/P EST LOW 20 MIN: CPT

## 2024-10-10 PROCEDURE — ZZZZZ: CPT

## 2024-10-11 DIAGNOSIS — F32.A DEPRESSION, UNSPECIFIED: ICD-10-CM

## 2024-10-15 ENCOUNTER — APPOINTMENT (OUTPATIENT)
Dept: PSYCHIATRY | Facility: CLINIC | Age: 54
End: 2024-10-15

## 2024-10-15 ENCOUNTER — OUTPATIENT (OUTPATIENT)
Dept: OUTPATIENT SERVICES | Facility: HOSPITAL | Age: 54
LOS: 1 days | End: 2024-10-15
Payer: MEDICAID

## 2024-10-15 DIAGNOSIS — F39 UNSPECIFIED MOOD [AFFECTIVE] DISORDER: ICD-10-CM

## 2024-10-15 PROCEDURE — 90834 PSYTX W PT 45 MINUTES: CPT

## 2024-10-16 DIAGNOSIS — F39 UNSPECIFIED MOOD [AFFECTIVE] DISORDER: ICD-10-CM

## 2024-10-21 ENCOUNTER — APPOINTMENT (OUTPATIENT)
Dept: PSYCHIATRY | Facility: CLINIC | Age: 54
End: 2024-10-21

## 2024-10-21 ENCOUNTER — OUTPATIENT (OUTPATIENT)
Dept: OUTPATIENT SERVICES | Facility: HOSPITAL | Age: 54
LOS: 1 days | End: 2024-10-21
Payer: MEDICAID

## 2024-10-21 DIAGNOSIS — F39 UNSPECIFIED MOOD [AFFECTIVE] DISORDER: ICD-10-CM

## 2024-10-21 PROCEDURE — 90834 PSYTX W PT 45 MINUTES: CPT

## 2024-10-22 DIAGNOSIS — F43.10 POST-TRAUMATIC STRESS DISORDER, UNSPECIFIED: ICD-10-CM

## 2024-10-22 DIAGNOSIS — F39 UNSPECIFIED MOOD [AFFECTIVE] DISORDER: ICD-10-CM

## 2024-10-29 ENCOUNTER — APPOINTMENT (OUTPATIENT)
Dept: PSYCHIATRY | Facility: CLINIC | Age: 54
End: 2024-10-29

## 2024-10-29 ENCOUNTER — OUTPATIENT (OUTPATIENT)
Dept: OUTPATIENT SERVICES | Facility: HOSPITAL | Age: 54
LOS: 1 days | End: 2024-10-29
Payer: MEDICAID

## 2024-10-29 DIAGNOSIS — F43.10 POST-TRAUMATIC STRESS DISORDER, UNSPECIFIED: ICD-10-CM

## 2024-10-29 DIAGNOSIS — F39 UNSPECIFIED MOOD [AFFECTIVE] DISORDER: ICD-10-CM

## 2024-10-29 PROCEDURE — 90832 PSYTX W PT 30 MINUTES: CPT

## 2024-10-30 DIAGNOSIS — F39 UNSPECIFIED MOOD [AFFECTIVE] DISORDER: ICD-10-CM

## 2024-11-04 ENCOUNTER — OUTPATIENT (OUTPATIENT)
Dept: OUTPATIENT SERVICES | Facility: HOSPITAL | Age: 54
LOS: 1 days | End: 2024-11-04
Payer: MEDICAID

## 2024-11-04 ENCOUNTER — APPOINTMENT (OUTPATIENT)
Dept: PSYCHIATRY | Facility: CLINIC | Age: 54
End: 2024-11-04

## 2024-11-04 DIAGNOSIS — F39 UNSPECIFIED MOOD [AFFECTIVE] DISORDER: ICD-10-CM

## 2024-11-04 PROCEDURE — 90834 PSYTX W PT 45 MINUTES: CPT

## 2024-11-05 DIAGNOSIS — F39 UNSPECIFIED MOOD [AFFECTIVE] DISORDER: ICD-10-CM

## 2024-11-08 ENCOUNTER — APPOINTMENT (OUTPATIENT)
Dept: PSYCHIATRY | Facility: CLINIC | Age: 54
End: 2024-11-08

## 2024-11-12 ENCOUNTER — APPOINTMENT (OUTPATIENT)
Dept: PSYCHIATRY | Facility: CLINIC | Age: 54
End: 2024-11-12

## 2024-11-12 ENCOUNTER — OUTPATIENT (OUTPATIENT)
Dept: OUTPATIENT SERVICES | Facility: HOSPITAL | Age: 54
LOS: 1 days | End: 2024-11-12
Payer: MEDICAID

## 2024-11-12 DIAGNOSIS — F39 UNSPECIFIED MOOD [AFFECTIVE] DISORDER: ICD-10-CM

## 2024-11-12 PROCEDURE — 90832 PSYTX W PT 30 MINUTES: CPT

## 2024-11-13 DIAGNOSIS — F43.10 POST-TRAUMATIC STRESS DISORDER, UNSPECIFIED: ICD-10-CM

## 2024-11-13 DIAGNOSIS — F39 UNSPECIFIED MOOD [AFFECTIVE] DISORDER: ICD-10-CM

## 2024-11-14 ENCOUNTER — APPOINTMENT (OUTPATIENT)
Dept: PSYCHIATRY | Facility: CLINIC | Age: 54
End: 2024-11-14
Payer: MEDICAID

## 2024-11-14 ENCOUNTER — OUTPATIENT (OUTPATIENT)
Dept: OUTPATIENT SERVICES | Facility: HOSPITAL | Age: 54
LOS: 1 days | End: 2024-11-14
Payer: MEDICAID

## 2024-11-14 DIAGNOSIS — F43.10 POST-TRAUMATIC STRESS DISORDER, UNSPECIFIED: ICD-10-CM

## 2024-11-14 DIAGNOSIS — F32.A DEPRESSION, UNSPECIFIED: ICD-10-CM

## 2024-11-14 DIAGNOSIS — F41.9 ANXIETY DISORDER, UNSPECIFIED: ICD-10-CM

## 2024-11-14 DIAGNOSIS — F33.1 MAJOR DEPRESSIVE DISORDER, RECURRENT, MODERATE: ICD-10-CM

## 2024-11-14 PROCEDURE — 99213 OFFICE O/P EST LOW 20 MIN: CPT

## 2024-11-14 PROCEDURE — ZZZZZ: CPT

## 2024-11-14 RX ORDER — BUPROPION HYDROCHLORIDE 150 MG/1
150 TABLET, EXTENDED RELEASE ORAL
Qty: 15 | Refills: 0 | Status: ACTIVE | COMMUNITY
Start: 2024-11-14 | End: 1900-01-01

## 2024-11-15 DIAGNOSIS — F41.9 ANXIETY DISORDER, UNSPECIFIED: ICD-10-CM

## 2024-11-15 DIAGNOSIS — F32.A DEPRESSION, UNSPECIFIED: ICD-10-CM

## 2024-11-18 ENCOUNTER — APPOINTMENT (OUTPATIENT)
Dept: PSYCHIATRY | Facility: CLINIC | Age: 54
End: 2024-11-18

## 2024-11-19 ENCOUNTER — NON-APPOINTMENT (OUTPATIENT)
Age: 54
End: 2024-11-19

## 2024-11-25 ENCOUNTER — APPOINTMENT (OUTPATIENT)
Dept: PSYCHIATRY | Facility: CLINIC | Age: 54
End: 2024-11-25

## 2024-11-25 ENCOUNTER — OUTPATIENT (OUTPATIENT)
Dept: OUTPATIENT SERVICES | Facility: HOSPITAL | Age: 54
LOS: 1 days | End: 2024-11-25
Payer: MEDICAID

## 2024-11-25 DIAGNOSIS — F39 UNSPECIFIED MOOD [AFFECTIVE] DISORDER: ICD-10-CM

## 2024-11-25 DIAGNOSIS — F43.10 POST-TRAUMATIC STRESS DISORDER, UNSPECIFIED: ICD-10-CM

## 2024-11-25 PROCEDURE — 90837 PSYTX W PT 60 MINUTES: CPT

## 2024-11-26 DIAGNOSIS — F39 UNSPECIFIED MOOD [AFFECTIVE] DISORDER: ICD-10-CM

## 2024-11-27 ENCOUNTER — APPOINTMENT (OUTPATIENT)
Dept: PSYCHIATRY | Facility: CLINIC | Age: 54
End: 2024-11-27

## 2024-11-27 ENCOUNTER — OUTPATIENT (OUTPATIENT)
Dept: OUTPATIENT SERVICES | Facility: HOSPITAL | Age: 54
LOS: 1 days | End: 2024-11-27
Payer: MEDICAID

## 2024-11-27 DIAGNOSIS — F39 UNSPECIFIED MOOD [AFFECTIVE] DISORDER: ICD-10-CM

## 2024-11-27 PROCEDURE — 90834 PSYTX W PT 45 MINUTES: CPT

## 2024-11-28 DIAGNOSIS — F39 UNSPECIFIED MOOD [AFFECTIVE] DISORDER: ICD-10-CM

## 2024-11-28 DIAGNOSIS — F43.10 POST-TRAUMATIC STRESS DISORDER, UNSPECIFIED: ICD-10-CM

## 2024-12-03 ENCOUNTER — APPOINTMENT (OUTPATIENT)
Dept: PSYCHIATRY | Facility: CLINIC | Age: 54
End: 2024-12-03

## 2024-12-03 ENCOUNTER — OUTPATIENT (OUTPATIENT)
Dept: OUTPATIENT SERVICES | Facility: HOSPITAL | Age: 54
LOS: 1 days | End: 2024-12-03
Payer: MEDICAID

## 2024-12-03 DIAGNOSIS — F39 UNSPECIFIED MOOD [AFFECTIVE] DISORDER: ICD-10-CM

## 2024-12-03 PROCEDURE — 90834 PSYTX W PT 45 MINUTES: CPT

## 2024-12-04 DIAGNOSIS — F39 UNSPECIFIED MOOD [AFFECTIVE] DISORDER: ICD-10-CM

## 2024-12-04 DIAGNOSIS — F43.10 POST-TRAUMATIC STRESS DISORDER, UNSPECIFIED: ICD-10-CM

## 2024-12-09 ENCOUNTER — APPOINTMENT (OUTPATIENT)
Dept: PSYCHIATRY | Facility: CLINIC | Age: 54
End: 2024-12-09

## 2024-12-09 ENCOUNTER — OUTPATIENT (OUTPATIENT)
Dept: OUTPATIENT SERVICES | Facility: HOSPITAL | Age: 54
LOS: 1 days | End: 2024-12-09
Payer: MEDICAID

## 2024-12-09 DIAGNOSIS — F39 UNSPECIFIED MOOD [AFFECTIVE] DISORDER: ICD-10-CM

## 2024-12-09 DIAGNOSIS — F43.10 POST-TRAUMATIC STRESS DISORDER, UNSPECIFIED: ICD-10-CM

## 2024-12-09 PROCEDURE — 90837 PSYTX W PT 60 MINUTES: CPT

## 2024-12-10 DIAGNOSIS — F39 UNSPECIFIED MOOD [AFFECTIVE] DISORDER: ICD-10-CM

## 2024-12-10 RX ORDER — BUPROPION HYDROCHLORIDE 100 MG/1
100 TABLET, FILM COATED ORAL DAILY
Qty: 30 | Refills: 2 | Status: ACTIVE | COMMUNITY
Start: 2024-12-10 | End: 1900-01-01

## 2024-12-12 ENCOUNTER — OUTPATIENT (OUTPATIENT)
Dept: OUTPATIENT SERVICES | Facility: HOSPITAL | Age: 54
LOS: 1 days | End: 2024-12-12
Payer: MEDICAID

## 2024-12-12 ENCOUNTER — APPOINTMENT (OUTPATIENT)
Dept: PSYCHIATRY | Facility: CLINIC | Age: 54
End: 2024-12-12

## 2024-12-12 DIAGNOSIS — F43.10 POST-TRAUMATIC STRESS DISORDER, UNSPECIFIED: ICD-10-CM

## 2024-12-12 DIAGNOSIS — F32.A DEPRESSION, UNSPECIFIED: ICD-10-CM

## 2024-12-12 DIAGNOSIS — F41.9 ANXIETY DISORDER, UNSPECIFIED: ICD-10-CM

## 2024-12-12 PROCEDURE — ZZZZZ: CPT

## 2024-12-12 PROCEDURE — 99214 OFFICE O/P EST MOD 30 MIN: CPT | Mod: 95

## 2024-12-13 DIAGNOSIS — F32.A DEPRESSION, UNSPECIFIED: ICD-10-CM

## 2024-12-13 DIAGNOSIS — F41.9 ANXIETY DISORDER, UNSPECIFIED: ICD-10-CM

## 2024-12-16 ENCOUNTER — APPOINTMENT (OUTPATIENT)
Dept: PSYCHIATRY | Facility: CLINIC | Age: 54
End: 2024-12-16

## 2024-12-16 ENCOUNTER — OUTPATIENT (OUTPATIENT)
Dept: OUTPATIENT SERVICES | Facility: HOSPITAL | Age: 54
LOS: 1 days | End: 2024-12-16
Payer: MEDICAID

## 2024-12-16 DIAGNOSIS — F39 UNSPECIFIED MOOD [AFFECTIVE] DISORDER: ICD-10-CM

## 2024-12-16 PROCEDURE — 90837 PSYTX W PT 60 MINUTES: CPT

## 2024-12-18 ENCOUNTER — OUTPATIENT (OUTPATIENT)
Dept: OUTPATIENT SERVICES | Facility: HOSPITAL | Age: 54
LOS: 1 days | End: 2024-12-18
Payer: MEDICAID

## 2024-12-18 ENCOUNTER — APPOINTMENT (OUTPATIENT)
Dept: PSYCHIATRY | Facility: CLINIC | Age: 54
End: 2024-12-18

## 2024-12-18 DIAGNOSIS — F41.0 PANIC DISORDER [EPISODIC PAROXYSMAL ANXIETY]: ICD-10-CM

## 2024-12-18 PROCEDURE — 90834 PSYTX W PT 45 MINUTES: CPT

## 2024-12-19 DIAGNOSIS — F41.0 PANIC DISORDER [EPISODIC PAROXYSMAL ANXIETY]: ICD-10-CM

## 2024-12-31 ENCOUNTER — APPOINTMENT (OUTPATIENT)
Dept: PSYCHIATRY | Facility: CLINIC | Age: 54
End: 2024-12-31
Payer: MEDICAID

## 2024-12-31 ENCOUNTER — OUTPATIENT (OUTPATIENT)
Dept: OUTPATIENT SERVICES | Facility: HOSPITAL | Age: 54
LOS: 1 days | End: 2024-12-31
Payer: MEDICAID

## 2024-12-31 DIAGNOSIS — F41.9 ANXIETY DISORDER, UNSPECIFIED: ICD-10-CM

## 2024-12-31 DIAGNOSIS — F32.A DEPRESSION, UNSPECIFIED: ICD-10-CM

## 2024-12-31 DIAGNOSIS — F43.10 POST-TRAUMATIC STRESS DISORDER, UNSPECIFIED: ICD-10-CM

## 2024-12-31 PROCEDURE — ZZZZZ: CPT

## 2024-12-31 PROCEDURE — 99213 OFFICE O/P EST LOW 20 MIN: CPT | Mod: 95

## 2025-01-01 DIAGNOSIS — F32.A DEPRESSION, UNSPECIFIED: ICD-10-CM

## 2025-01-01 DIAGNOSIS — F43.10 POST-TRAUMATIC STRESS DISORDER, UNSPECIFIED: ICD-10-CM

## 2025-01-01 DIAGNOSIS — F41.9 ANXIETY DISORDER, UNSPECIFIED: ICD-10-CM

## 2025-01-02 ENCOUNTER — OUTPATIENT (OUTPATIENT)
Dept: OUTPATIENT SERVICES | Facility: HOSPITAL | Age: 55
LOS: 1 days | End: 2025-01-02
Payer: MEDICAID

## 2025-01-02 ENCOUNTER — APPOINTMENT (OUTPATIENT)
Dept: PSYCHIATRY | Facility: CLINIC | Age: 55
End: 2025-01-02

## 2025-01-02 DIAGNOSIS — F43.10 POST-TRAUMATIC STRESS DISORDER, UNSPECIFIED: ICD-10-CM

## 2025-01-02 DIAGNOSIS — F39 UNSPECIFIED MOOD [AFFECTIVE] DISORDER: ICD-10-CM

## 2025-01-02 PROCEDURE — 90837 PSYTX W PT 60 MINUTES: CPT

## 2025-01-03 DIAGNOSIS — F43.10 POST-TRAUMATIC STRESS DISORDER, UNSPECIFIED: ICD-10-CM

## 2025-01-06 ENCOUNTER — APPOINTMENT (OUTPATIENT)
Dept: PSYCHIATRY | Facility: CLINIC | Age: 55
End: 2025-01-06

## 2025-01-06 ENCOUNTER — OUTPATIENT (OUTPATIENT)
Dept: OUTPATIENT SERVICES | Facility: HOSPITAL | Age: 55
LOS: 1 days | End: 2025-01-06
Payer: MEDICAID

## 2025-01-06 DIAGNOSIS — F43.10 POST-TRAUMATIC STRESS DISORDER, UNSPECIFIED: ICD-10-CM

## 2025-01-06 PROCEDURE — 90837 PSYTX W PT 60 MINUTES: CPT

## 2025-01-07 DIAGNOSIS — F43.10 POST-TRAUMATIC STRESS DISORDER, UNSPECIFIED: ICD-10-CM

## 2025-01-07 DIAGNOSIS — F39 UNSPECIFIED MOOD [AFFECTIVE] DISORDER: ICD-10-CM

## 2025-01-13 ENCOUNTER — OUTPATIENT (OUTPATIENT)
Dept: OUTPATIENT SERVICES | Facility: HOSPITAL | Age: 55
LOS: 1 days | End: 2025-01-13
Payer: MEDICAID

## 2025-01-13 ENCOUNTER — APPOINTMENT (OUTPATIENT)
Dept: PSYCHIATRY | Facility: CLINIC | Age: 55
End: 2025-01-13

## 2025-01-13 DIAGNOSIS — F43.10 POST-TRAUMATIC STRESS DISORDER, UNSPECIFIED: ICD-10-CM

## 2025-01-13 DIAGNOSIS — F39 UNSPECIFIED MOOD [AFFECTIVE] DISORDER: ICD-10-CM

## 2025-01-13 PROCEDURE — 90837 PSYTX W PT 60 MINUTES: CPT

## 2025-01-14 DIAGNOSIS — F39 UNSPECIFIED MOOD [AFFECTIVE] DISORDER: ICD-10-CM

## 2025-01-23 ENCOUNTER — APPOINTMENT (OUTPATIENT)
Dept: PSYCHIATRY | Facility: CLINIC | Age: 55
End: 2025-01-23

## 2025-01-27 ENCOUNTER — APPOINTMENT (OUTPATIENT)
Dept: PSYCHIATRY | Facility: CLINIC | Age: 55
End: 2025-01-27

## 2025-01-27 ENCOUNTER — OUTPATIENT (OUTPATIENT)
Dept: OUTPATIENT SERVICES | Facility: HOSPITAL | Age: 55
LOS: 1 days | End: 2025-01-27
Payer: MEDICAID

## 2025-01-27 DIAGNOSIS — F43.10 POST-TRAUMATIC STRESS DISORDER, UNSPECIFIED: ICD-10-CM

## 2025-01-27 DIAGNOSIS — F39 UNSPECIFIED MOOD [AFFECTIVE] DISORDER: ICD-10-CM

## 2025-01-27 PROCEDURE — 90837 PSYTX W PT 60 MINUTES: CPT

## 2025-01-28 DIAGNOSIS — F39 UNSPECIFIED MOOD [AFFECTIVE] DISORDER: ICD-10-CM

## 2025-01-29 ENCOUNTER — APPOINTMENT (OUTPATIENT)
Dept: INTERNAL MEDICINE | Facility: CLINIC | Age: 55
End: 2025-01-29

## 2025-01-30 ENCOUNTER — APPOINTMENT (OUTPATIENT)
Dept: PSYCHIATRY | Facility: CLINIC | Age: 55
End: 2025-01-30

## 2025-01-30 ENCOUNTER — OUTPATIENT (OUTPATIENT)
Dept: OUTPATIENT SERVICES | Facility: HOSPITAL | Age: 55
LOS: 1 days | End: 2025-01-30
Payer: MEDICAID

## 2025-01-30 DIAGNOSIS — F39 UNSPECIFIED MOOD [AFFECTIVE] DISORDER: ICD-10-CM

## 2025-01-30 PROCEDURE — 90834 PSYTX W PT 45 MINUTES: CPT

## 2025-01-31 DIAGNOSIS — F39 UNSPECIFIED MOOD [AFFECTIVE] DISORDER: ICD-10-CM

## 2025-01-31 DIAGNOSIS — F43.10 POST-TRAUMATIC STRESS DISORDER, UNSPECIFIED: ICD-10-CM

## 2025-02-07 ENCOUNTER — APPOINTMENT (OUTPATIENT)
Dept: PSYCHIATRY | Facility: CLINIC | Age: 55
End: 2025-02-07

## 2025-02-07 ENCOUNTER — OUTPATIENT (OUTPATIENT)
Dept: OUTPATIENT SERVICES | Facility: HOSPITAL | Age: 55
LOS: 1 days | End: 2025-02-07
Payer: MEDICAID

## 2025-02-07 DIAGNOSIS — F39 UNSPECIFIED MOOD [AFFECTIVE] DISORDER: ICD-10-CM

## 2025-02-07 DIAGNOSIS — F43.10 POST-TRAUMATIC STRESS DISORDER, UNSPECIFIED: ICD-10-CM

## 2025-02-07 PROCEDURE — 90834 PSYTX W PT 45 MINUTES: CPT

## 2025-02-08 DIAGNOSIS — F39 UNSPECIFIED MOOD [AFFECTIVE] DISORDER: ICD-10-CM

## 2025-02-08 DIAGNOSIS — F43.10 POST-TRAUMATIC STRESS DISORDER, UNSPECIFIED: ICD-10-CM

## 2025-02-13 ENCOUNTER — OUTPATIENT (OUTPATIENT)
Dept: OUTPATIENT SERVICES | Facility: HOSPITAL | Age: 55
LOS: 1 days | End: 2025-02-13
Payer: MEDICAID

## 2025-02-13 ENCOUNTER — APPOINTMENT (OUTPATIENT)
Dept: PSYCHIATRY | Facility: CLINIC | Age: 55
End: 2025-02-13

## 2025-02-13 PROCEDURE — 90834 PSYTX W PT 45 MINUTES: CPT

## 2025-02-14 DIAGNOSIS — F43.10 POST-TRAUMATIC STRESS DISORDER, UNSPECIFIED: ICD-10-CM

## 2025-02-14 DIAGNOSIS — F39 UNSPECIFIED MOOD [AFFECTIVE] DISORDER: ICD-10-CM

## 2025-02-20 ENCOUNTER — OUTPATIENT (OUTPATIENT)
Dept: OUTPATIENT SERVICES | Facility: HOSPITAL | Age: 55
LOS: 1 days | End: 2025-02-20
Payer: MEDICAID

## 2025-02-20 ENCOUNTER — TRANSCRIPTION ENCOUNTER (OUTPATIENT)
Age: 55
End: 2025-02-20

## 2025-02-20 ENCOUNTER — APPOINTMENT (OUTPATIENT)
Dept: PSYCHIATRY | Facility: CLINIC | Age: 55
End: 2025-02-20

## 2025-02-20 DIAGNOSIS — F43.10 POST-TRAUMATIC STRESS DISORDER, UNSPECIFIED: ICD-10-CM

## 2025-02-20 DIAGNOSIS — F39 UNSPECIFIED MOOD [AFFECTIVE] DISORDER: ICD-10-CM

## 2025-02-20 PROCEDURE — 90837 PSYTX W PT 60 MINUTES: CPT

## 2025-02-21 DIAGNOSIS — F39 UNSPECIFIED MOOD [AFFECTIVE] DISORDER: ICD-10-CM

## 2025-03-03 ENCOUNTER — APPOINTMENT (OUTPATIENT)
Dept: PSYCHIATRY | Facility: CLINIC | Age: 55
End: 2025-03-03

## 2025-03-03 ENCOUNTER — OUTPATIENT (OUTPATIENT)
Dept: OUTPATIENT SERVICES | Facility: HOSPITAL | Age: 55
LOS: 1 days | End: 2025-03-03
Payer: MEDICAID

## 2025-03-03 DIAGNOSIS — F43.10 POST-TRAUMATIC STRESS DISORDER, UNSPECIFIED: ICD-10-CM

## 2025-03-03 PROCEDURE — 90832 PSYTX W PT 30 MINUTES: CPT

## 2025-03-04 DIAGNOSIS — F39 UNSPECIFIED MOOD [AFFECTIVE] DISORDER: ICD-10-CM

## 2025-03-10 ENCOUNTER — APPOINTMENT (OUTPATIENT)
Dept: PSYCHIATRY | Facility: CLINIC | Age: 55
End: 2025-03-10

## 2025-03-10 ENCOUNTER — OUTPATIENT (OUTPATIENT)
Dept: OUTPATIENT SERVICES | Facility: HOSPITAL | Age: 55
LOS: 1 days | End: 2025-03-10
Payer: MEDICAID

## 2025-03-10 DIAGNOSIS — F43.10 POST-TRAUMATIC STRESS DISORDER, UNSPECIFIED: ICD-10-CM

## 2025-03-10 DIAGNOSIS — F39 UNSPECIFIED MOOD [AFFECTIVE] DISORDER: ICD-10-CM

## 2025-03-10 PROCEDURE — 90837 PSYTX W PT 60 MINUTES: CPT

## 2025-03-11 DIAGNOSIS — F39 UNSPECIFIED MOOD [AFFECTIVE] DISORDER: ICD-10-CM

## 2025-03-13 ENCOUNTER — OUTPATIENT (OUTPATIENT)
Dept: OUTPATIENT SERVICES | Facility: HOSPITAL | Age: 55
LOS: 1 days | End: 2025-03-13
Payer: MEDICAID

## 2025-03-13 ENCOUNTER — APPOINTMENT (OUTPATIENT)
Dept: PSYCHIATRY | Facility: CLINIC | Age: 55
End: 2025-03-13

## 2025-03-13 DIAGNOSIS — F39 UNSPECIFIED MOOD [AFFECTIVE] DISORDER: ICD-10-CM

## 2025-03-13 PROCEDURE — 90837 PSYTX W PT 60 MINUTES: CPT

## 2025-03-14 ENCOUNTER — OUTPATIENT (OUTPATIENT)
Dept: OUTPATIENT SERVICES | Facility: HOSPITAL | Age: 55
LOS: 1 days | End: 2025-03-14
Payer: MEDICAID

## 2025-03-14 ENCOUNTER — APPOINTMENT (OUTPATIENT)
Dept: PSYCHIATRY | Facility: CLINIC | Age: 55
End: 2025-03-14
Payer: MEDICAID

## 2025-03-14 DIAGNOSIS — F32.A DEPRESSION, UNSPECIFIED: ICD-10-CM

## 2025-03-14 DIAGNOSIS — F39 UNSPECIFIED MOOD [AFFECTIVE] DISORDER: ICD-10-CM

## 2025-03-14 DIAGNOSIS — F43.10 POST-TRAUMATIC STRESS DISORDER, UNSPECIFIED: ICD-10-CM

## 2025-03-14 DIAGNOSIS — F41.9 ANXIETY DISORDER, UNSPECIFIED: ICD-10-CM

## 2025-03-14 PROCEDURE — ZZZZZ: CPT

## 2025-03-14 PROCEDURE — 98007 SYNCH AUDIO-VIDEO EST HI 40: CPT

## 2025-03-14 RX ORDER — BUPROPION HYDROCHLORIDE 75 MG/1
75 TABLET, FILM COATED ORAL
Qty: 60 | Refills: 0 | Status: ACTIVE | COMMUNITY
Start: 2025-03-14 | End: 1900-01-01

## 2025-03-15 DIAGNOSIS — F32.A DEPRESSION, UNSPECIFIED: ICD-10-CM

## 2025-03-17 ENCOUNTER — APPOINTMENT (OUTPATIENT)
Dept: PSYCHIATRY | Facility: CLINIC | Age: 55
End: 2025-03-17

## 2025-03-20 ENCOUNTER — APPOINTMENT (OUTPATIENT)
Dept: PSYCHIATRY | Facility: CLINIC | Age: 55
End: 2025-03-20

## 2025-03-20 ENCOUNTER — OUTPATIENT (OUTPATIENT)
Dept: OUTPATIENT SERVICES | Facility: HOSPITAL | Age: 55
LOS: 1 days | End: 2025-03-20
Payer: MEDICAID

## 2025-03-20 DIAGNOSIS — F43.10 POST-TRAUMATIC STRESS DISORDER, UNSPECIFIED: ICD-10-CM

## 2025-03-20 PROCEDURE — 90837 PSYTX W PT 60 MINUTES: CPT

## 2025-03-21 DIAGNOSIS — F39 UNSPECIFIED MOOD [AFFECTIVE] DISORDER: ICD-10-CM

## 2025-03-24 ENCOUNTER — APPOINTMENT (OUTPATIENT)
Dept: PSYCHIATRY | Facility: CLINIC | Age: 55
End: 2025-03-24

## 2025-03-27 ENCOUNTER — OUTPATIENT (OUTPATIENT)
Dept: OUTPATIENT SERVICES | Facility: HOSPITAL | Age: 55
LOS: 1 days | End: 2025-03-27
Payer: MEDICAID

## 2025-03-27 ENCOUNTER — APPOINTMENT (OUTPATIENT)
Dept: PSYCHIATRY | Facility: CLINIC | Age: 55
End: 2025-03-27

## 2025-03-27 DIAGNOSIS — F43.10 POST-TRAUMATIC STRESS DISORDER, UNSPECIFIED: ICD-10-CM

## 2025-03-27 DIAGNOSIS — F39 UNSPECIFIED MOOD [AFFECTIVE] DISORDER: ICD-10-CM

## 2025-03-27 PROCEDURE — 90837 PSYTX W PT 60 MINUTES: CPT

## 2025-03-28 DIAGNOSIS — F39 UNSPECIFIED MOOD [AFFECTIVE] DISORDER: ICD-10-CM

## 2025-03-31 ENCOUNTER — OUTPATIENT (OUTPATIENT)
Dept: OUTPATIENT SERVICES | Facility: HOSPITAL | Age: 55
LOS: 1 days | End: 2025-03-31
Payer: MEDICAID

## 2025-03-31 ENCOUNTER — APPOINTMENT (OUTPATIENT)
Dept: PSYCHIATRY | Facility: CLINIC | Age: 55
End: 2025-03-31

## 2025-03-31 DIAGNOSIS — F39 UNSPECIFIED MOOD [AFFECTIVE] DISORDER: ICD-10-CM

## 2025-03-31 PROCEDURE — 90837 PSYTX W PT 60 MINUTES: CPT

## 2025-04-03 ENCOUNTER — APPOINTMENT (OUTPATIENT)
Dept: PSYCHIATRY | Facility: CLINIC | Age: 55
End: 2025-04-03

## 2025-04-03 ENCOUNTER — OUTPATIENT (OUTPATIENT)
Dept: OUTPATIENT SERVICES | Facility: HOSPITAL | Age: 55
LOS: 1 days | End: 2025-04-03
Payer: MEDICAID

## 2025-04-03 PROCEDURE — 90834 PSYTX W PT 45 MINUTES: CPT

## 2025-04-04 DIAGNOSIS — F39 UNSPECIFIED MOOD [AFFECTIVE] DISORDER: ICD-10-CM

## 2025-04-07 ENCOUNTER — OUTPATIENT (OUTPATIENT)
Dept: OUTPATIENT SERVICES | Facility: HOSPITAL | Age: 55
LOS: 1 days | End: 2025-04-07
Payer: MEDICAID

## 2025-04-07 ENCOUNTER — APPOINTMENT (OUTPATIENT)
Dept: PSYCHIATRY | Facility: CLINIC | Age: 55
End: 2025-04-07

## 2025-04-07 DIAGNOSIS — F43.10 POST-TRAUMATIC STRESS DISORDER, UNSPECIFIED: ICD-10-CM

## 2025-04-07 DIAGNOSIS — F39 UNSPECIFIED MOOD [AFFECTIVE] DISORDER: ICD-10-CM

## 2025-04-07 PROCEDURE — 90837 PSYTX W PT 60 MINUTES: CPT

## 2025-04-08 DIAGNOSIS — F43.10 POST-TRAUMATIC STRESS DISORDER, UNSPECIFIED: ICD-10-CM

## 2025-04-08 DIAGNOSIS — F39 UNSPECIFIED MOOD [AFFECTIVE] DISORDER: ICD-10-CM

## 2025-04-10 ENCOUNTER — OUTPATIENT (OUTPATIENT)
Dept: OUTPATIENT SERVICES | Facility: HOSPITAL | Age: 55
LOS: 1 days | End: 2025-04-10
Payer: MEDICAID

## 2025-04-10 ENCOUNTER — APPOINTMENT (OUTPATIENT)
Dept: PSYCHIATRY | Facility: CLINIC | Age: 55
End: 2025-04-10

## 2025-04-10 DIAGNOSIS — F43.10 POST-TRAUMATIC STRESS DISORDER, UNSPECIFIED: ICD-10-CM

## 2025-04-10 DIAGNOSIS — F39 UNSPECIFIED MOOD [AFFECTIVE] DISORDER: ICD-10-CM

## 2025-04-10 PROCEDURE — 90834 PSYTX W PT 45 MINUTES: CPT

## 2025-04-11 ENCOUNTER — APPOINTMENT (OUTPATIENT)
Dept: PSYCHIATRY | Facility: CLINIC | Age: 55
End: 2025-04-11
Payer: MEDICAID

## 2025-04-11 ENCOUNTER — OUTPATIENT (OUTPATIENT)
Dept: OUTPATIENT SERVICES | Facility: HOSPITAL | Age: 55
LOS: 1 days | End: 2025-04-11
Payer: MEDICAID

## 2025-04-11 DIAGNOSIS — F43.10 POST-TRAUMATIC STRESS DISORDER, UNSPECIFIED: ICD-10-CM

## 2025-04-11 DIAGNOSIS — F32.A DEPRESSION, UNSPECIFIED: ICD-10-CM

## 2025-04-11 DIAGNOSIS — F41.9 ANXIETY DISORDER, UNSPECIFIED: ICD-10-CM

## 2025-04-11 DIAGNOSIS — F39 UNSPECIFIED MOOD [AFFECTIVE] DISORDER: ICD-10-CM

## 2025-04-11 PROCEDURE — 98007 SYNCH AUDIO-VIDEO EST HI 40: CPT

## 2025-04-11 PROCEDURE — ZZZZZ: CPT

## 2025-04-11 RX ORDER — BUPROPION HYDROCHLORIDE 100 MG/1
100 TABLET, FILM COATED ORAL
Qty: 60 | Refills: 0 | Status: ACTIVE | COMMUNITY
Start: 2025-04-11 | End: 1900-01-01

## 2025-04-12 DIAGNOSIS — F41.9 ANXIETY DISORDER, UNSPECIFIED: ICD-10-CM

## 2025-04-12 DIAGNOSIS — F43.10 POST-TRAUMATIC STRESS DISORDER, UNSPECIFIED: ICD-10-CM

## 2025-04-12 DIAGNOSIS — F32.A DEPRESSION, UNSPECIFIED: ICD-10-CM

## 2025-04-14 ENCOUNTER — APPOINTMENT (OUTPATIENT)
Dept: PSYCHIATRY | Facility: CLINIC | Age: 55
End: 2025-04-14

## 2025-04-14 ENCOUNTER — OUTPATIENT (OUTPATIENT)
Dept: OUTPATIENT SERVICES | Facility: HOSPITAL | Age: 55
LOS: 1 days | End: 2025-04-14
Payer: MEDICAID

## 2025-04-14 DIAGNOSIS — F39 UNSPECIFIED MOOD [AFFECTIVE] DISORDER: ICD-10-CM

## 2025-04-14 DIAGNOSIS — F43.10 POST-TRAUMATIC STRESS DISORDER, UNSPECIFIED: ICD-10-CM

## 2025-04-14 PROCEDURE — 90834 PSYTX W PT 45 MINUTES: CPT

## 2025-04-15 DIAGNOSIS — F39 UNSPECIFIED MOOD [AFFECTIVE] DISORDER: ICD-10-CM

## 2025-04-21 ENCOUNTER — OUTPATIENT (OUTPATIENT)
Dept: OUTPATIENT SERVICES | Facility: HOSPITAL | Age: 55
LOS: 1 days | End: 2025-04-21
Payer: MEDICAID

## 2025-04-21 ENCOUNTER — APPOINTMENT (OUTPATIENT)
Dept: PSYCHIATRY | Facility: CLINIC | Age: 55
End: 2025-04-21

## 2025-04-21 DIAGNOSIS — F39 UNSPECIFIED MOOD [AFFECTIVE] DISORDER: ICD-10-CM

## 2025-04-21 DIAGNOSIS — F43.10 POST-TRAUMATIC STRESS DISORDER, UNSPECIFIED: ICD-10-CM

## 2025-04-21 PROCEDURE — 90837 PSYTX W PT 60 MINUTES: CPT

## 2025-04-22 DIAGNOSIS — F39 UNSPECIFIED MOOD [AFFECTIVE] DISORDER: ICD-10-CM

## 2025-05-01 ENCOUNTER — APPOINTMENT (OUTPATIENT)
Dept: PSYCHIATRY | Facility: CLINIC | Age: 55
End: 2025-05-01

## 2025-05-01 ENCOUNTER — NON-APPOINTMENT (OUTPATIENT)
Age: 55
End: 2025-05-01

## 2025-05-05 ENCOUNTER — APPOINTMENT (OUTPATIENT)
Dept: PSYCHIATRY | Facility: CLINIC | Age: 55
End: 2025-05-05

## 2025-05-05 ENCOUNTER — OUTPATIENT (OUTPATIENT)
Dept: OUTPATIENT SERVICES | Facility: HOSPITAL | Age: 55
LOS: 1 days | End: 2025-05-05
Payer: MEDICAID

## 2025-05-05 PROCEDURE — 90832 PSYTX W PT 30 MINUTES: CPT

## 2025-05-06 DIAGNOSIS — F39 UNSPECIFIED MOOD [AFFECTIVE] DISORDER: ICD-10-CM

## 2025-05-09 ENCOUNTER — OUTPATIENT (OUTPATIENT)
Dept: OUTPATIENT SERVICES | Facility: HOSPITAL | Age: 55
LOS: 1 days | End: 2025-05-09
Payer: MEDICAID

## 2025-05-09 ENCOUNTER — APPOINTMENT (OUTPATIENT)
Dept: PSYCHIATRY | Facility: CLINIC | Age: 55
End: 2025-05-09
Payer: MEDICAID

## 2025-05-09 DIAGNOSIS — F32.A DEPRESSION, UNSPECIFIED: ICD-10-CM

## 2025-05-09 DIAGNOSIS — F43.10 POST-TRAUMATIC STRESS DISORDER, UNSPECIFIED: ICD-10-CM

## 2025-05-09 DIAGNOSIS — F41.9 ANXIETY DISORDER, UNSPECIFIED: ICD-10-CM

## 2025-05-09 PROCEDURE — 98007 SYNCH AUDIO-VIDEO EST HI 40: CPT

## 2025-05-09 PROCEDURE — ZZZZZ: CPT

## 2025-05-10 DIAGNOSIS — F32.A DEPRESSION, UNSPECIFIED: ICD-10-CM

## 2025-05-10 DIAGNOSIS — F41.9 ANXIETY DISORDER, UNSPECIFIED: ICD-10-CM

## 2025-05-10 DIAGNOSIS — F43.10 POST-TRAUMATIC STRESS DISORDER, UNSPECIFIED: ICD-10-CM

## 2025-05-12 ENCOUNTER — OUTPATIENT (OUTPATIENT)
Dept: OUTPATIENT SERVICES | Facility: HOSPITAL | Age: 55
LOS: 1 days | End: 2025-05-12
Payer: MEDICAID

## 2025-05-12 ENCOUNTER — APPOINTMENT (OUTPATIENT)
Dept: PSYCHIATRY | Facility: CLINIC | Age: 55
End: 2025-05-12

## 2025-05-12 DIAGNOSIS — F39 UNSPECIFIED MOOD [AFFECTIVE] DISORDER: ICD-10-CM

## 2025-05-12 PROCEDURE — 90834 PSYTX W PT 45 MINUTES: CPT

## 2025-05-13 DIAGNOSIS — F39 UNSPECIFIED MOOD [AFFECTIVE] DISORDER: ICD-10-CM

## 2025-05-13 DIAGNOSIS — F43.10 POST-TRAUMATIC STRESS DISORDER, UNSPECIFIED: ICD-10-CM

## 2025-05-19 ENCOUNTER — APPOINTMENT (OUTPATIENT)
Dept: PSYCHIATRY | Facility: CLINIC | Age: 55
End: 2025-05-19

## 2025-05-19 ENCOUNTER — OUTPATIENT (OUTPATIENT)
Dept: OUTPATIENT SERVICES | Facility: HOSPITAL | Age: 55
LOS: 1 days | End: 2025-05-19
Payer: MEDICAID

## 2025-05-19 DIAGNOSIS — F43.10 POST-TRAUMATIC STRESS DISORDER, UNSPECIFIED: ICD-10-CM

## 2025-05-19 DIAGNOSIS — F39 UNSPECIFIED MOOD [AFFECTIVE] DISORDER: ICD-10-CM

## 2025-05-19 PROCEDURE — 90837 PSYTX W PT 60 MINUTES: CPT

## 2025-05-20 DIAGNOSIS — F39 UNSPECIFIED MOOD [AFFECTIVE] DISORDER: ICD-10-CM

## 2025-05-28 RX ORDER — BUPROPION HYDROCHLORIDE 100 MG/1
100 TABLET, FILM COATED ORAL
Qty: 60 | Refills: 2 | Status: ACTIVE | COMMUNITY
Start: 2025-05-28 | End: 1900-01-01

## 2025-05-29 ENCOUNTER — APPOINTMENT (OUTPATIENT)
Dept: PSYCHIATRY | Facility: CLINIC | Age: 55
End: 2025-05-29

## 2025-06-02 ENCOUNTER — APPOINTMENT (OUTPATIENT)
Dept: INTERNAL MEDICINE | Facility: CLINIC | Age: 55
End: 2025-06-02

## 2025-06-02 ENCOUNTER — APPOINTMENT (OUTPATIENT)
Dept: PSYCHIATRY | Facility: CLINIC | Age: 55
End: 2025-06-02

## 2025-06-02 ENCOUNTER — OUTPATIENT (OUTPATIENT)
Dept: OUTPATIENT SERVICES | Facility: HOSPITAL | Age: 55
LOS: 1 days | End: 2025-06-02
Payer: MEDICAID

## 2025-06-02 DIAGNOSIS — F39 UNSPECIFIED MOOD [AFFECTIVE] DISORDER: ICD-10-CM

## 2025-06-02 PROCEDURE — 90837 PSYTX W PT 60 MINUTES: CPT

## 2025-06-03 DIAGNOSIS — F39 UNSPECIFIED MOOD [AFFECTIVE] DISORDER: ICD-10-CM

## 2025-06-09 ENCOUNTER — APPOINTMENT (OUTPATIENT)
Dept: PSYCHIATRY | Facility: CLINIC | Age: 55
End: 2025-06-09

## 2025-06-09 ENCOUNTER — OUTPATIENT (OUTPATIENT)
Dept: OUTPATIENT SERVICES | Facility: HOSPITAL | Age: 55
LOS: 1 days | End: 2025-06-09
Payer: MEDICAID

## 2025-06-09 DIAGNOSIS — F39 UNSPECIFIED MOOD [AFFECTIVE] DISORDER: ICD-10-CM

## 2025-06-09 PROCEDURE — 90837 PSYTX W PT 60 MINUTES: CPT

## 2025-06-10 DIAGNOSIS — F39 UNSPECIFIED MOOD [AFFECTIVE] DISORDER: ICD-10-CM

## 2025-06-16 ENCOUNTER — OUTPATIENT (OUTPATIENT)
Dept: OUTPATIENT SERVICES | Facility: HOSPITAL | Age: 55
LOS: 1 days | End: 2025-06-16
Payer: MEDICAID

## 2025-06-16 ENCOUNTER — APPOINTMENT (OUTPATIENT)
Dept: PSYCHIATRY | Facility: CLINIC | Age: 55
End: 2025-06-16

## 2025-06-16 DIAGNOSIS — F39 UNSPECIFIED MOOD [AFFECTIVE] DISORDER: ICD-10-CM

## 2025-06-16 PROCEDURE — 90837 PSYTX W PT 60 MINUTES: CPT

## 2025-06-17 DIAGNOSIS — F39 UNSPECIFIED MOOD [AFFECTIVE] DISORDER: ICD-10-CM

## 2025-06-30 ENCOUNTER — APPOINTMENT (OUTPATIENT)
Dept: PSYCHIATRY | Facility: CLINIC | Age: 55
End: 2025-06-30

## 2025-06-30 ENCOUNTER — OUTPATIENT (OUTPATIENT)
Dept: OUTPATIENT SERVICES | Facility: HOSPITAL | Age: 55
LOS: 1 days | End: 2025-06-30
Payer: MEDICAID

## 2025-06-30 DIAGNOSIS — F39 UNSPECIFIED MOOD [AFFECTIVE] DISORDER: ICD-10-CM

## 2025-06-30 PROCEDURE — 90834 PSYTX W PT 45 MINUTES: CPT

## 2025-07-01 DIAGNOSIS — F43.10 POST-TRAUMATIC STRESS DISORDER, UNSPECIFIED: ICD-10-CM

## 2025-07-01 DIAGNOSIS — F39 UNSPECIFIED MOOD [AFFECTIVE] DISORDER: ICD-10-CM

## 2025-07-05 ENCOUNTER — NON-APPOINTMENT (OUTPATIENT)
Age: 55
End: 2025-07-05

## 2025-07-07 ENCOUNTER — OUTPATIENT (OUTPATIENT)
Dept: OUTPATIENT SERVICES | Facility: HOSPITAL | Age: 55
LOS: 1 days | End: 2025-07-07
Payer: MEDICAID

## 2025-07-07 ENCOUNTER — APPOINTMENT (OUTPATIENT)
Dept: PSYCHIATRY | Facility: CLINIC | Age: 55
End: 2025-07-07

## 2025-07-07 DIAGNOSIS — F39 UNSPECIFIED MOOD [AFFECTIVE] DISORDER: ICD-10-CM

## 2025-07-07 PROCEDURE — 90834 PSYTX W PT 45 MINUTES: CPT

## 2025-07-14 ENCOUNTER — OUTPATIENT (OUTPATIENT)
Dept: OUTPATIENT SERVICES | Facility: HOSPITAL | Age: 55
LOS: 1 days | End: 2025-07-14
Payer: MEDICAID

## 2025-07-14 ENCOUNTER — APPOINTMENT (OUTPATIENT)
Dept: PSYCHIATRY | Facility: CLINIC | Age: 55
End: 2025-07-14

## 2025-07-14 PROCEDURE — 90834 PSYTX W PT 45 MINUTES: CPT

## 2025-07-15 DIAGNOSIS — F43.10 POST-TRAUMATIC STRESS DISORDER, UNSPECIFIED: ICD-10-CM

## 2025-07-21 ENCOUNTER — APPOINTMENT (OUTPATIENT)
Dept: PSYCHIATRY | Facility: CLINIC | Age: 55
End: 2025-07-21

## 2025-07-21 ENCOUNTER — OUTPATIENT (OUTPATIENT)
Dept: OUTPATIENT SERVICES | Facility: HOSPITAL | Age: 55
LOS: 1 days | End: 2025-07-21
Payer: MEDICAID

## 2025-07-21 DIAGNOSIS — F39 UNSPECIFIED MOOD [AFFECTIVE] DISORDER: ICD-10-CM

## 2025-07-21 PROCEDURE — 90832 PSYTX W PT 30 MINUTES: CPT

## 2025-07-22 DIAGNOSIS — F39 UNSPECIFIED MOOD [AFFECTIVE] DISORDER: ICD-10-CM

## 2025-07-28 ENCOUNTER — APPOINTMENT (OUTPATIENT)
Dept: PSYCHIATRY | Facility: CLINIC | Age: 55
End: 2025-07-28

## 2025-07-28 ENCOUNTER — OUTPATIENT (OUTPATIENT)
Dept: OUTPATIENT SERVICES | Facility: HOSPITAL | Age: 55
LOS: 1 days | End: 2025-07-28
Payer: MEDICAID

## 2025-07-28 ENCOUNTER — NON-APPOINTMENT (OUTPATIENT)
Age: 55
End: 2025-07-28

## 2025-07-28 DIAGNOSIS — F39 UNSPECIFIED MOOD [AFFECTIVE] DISORDER: ICD-10-CM

## 2025-07-28 PROCEDURE — 90834 PSYTX W PT 45 MINUTES: CPT

## 2025-07-29 DIAGNOSIS — F39 UNSPECIFIED MOOD [AFFECTIVE] DISORDER: ICD-10-CM

## 2025-08-04 ENCOUNTER — APPOINTMENT (OUTPATIENT)
Dept: PSYCHIATRY | Facility: CLINIC | Age: 55
End: 2025-08-04

## 2025-08-11 ENCOUNTER — APPOINTMENT (OUTPATIENT)
Dept: PSYCHIATRY | Facility: CLINIC | Age: 55
End: 2025-08-11

## 2025-08-11 ENCOUNTER — OUTPATIENT (OUTPATIENT)
Dept: OUTPATIENT SERVICES | Facility: HOSPITAL | Age: 55
LOS: 1 days | End: 2025-08-11
Payer: MEDICAID

## 2025-08-11 DIAGNOSIS — F39 UNSPECIFIED MOOD [AFFECTIVE] DISORDER: ICD-10-CM

## 2025-08-11 DIAGNOSIS — F43.10 POST-TRAUMATIC STRESS DISORDER, UNSPECIFIED: ICD-10-CM

## 2025-08-11 PROCEDURE — 90834 PSYTX W PT 45 MINUTES: CPT

## 2025-08-12 ENCOUNTER — OUTPATIENT (OUTPATIENT)
Dept: OUTPATIENT SERVICES | Facility: HOSPITAL | Age: 55
LOS: 1 days | End: 2025-08-12
Payer: MEDICAID

## 2025-08-12 ENCOUNTER — APPOINTMENT (OUTPATIENT)
Dept: PSYCHIATRY | Facility: CLINIC | Age: 55
End: 2025-08-12
Payer: MEDICAID

## 2025-08-12 DIAGNOSIS — F41.9 ANXIETY DISORDER, UNSPECIFIED: ICD-10-CM

## 2025-08-12 DIAGNOSIS — F43.10 POST-TRAUMATIC STRESS DISORDER, UNSPECIFIED: ICD-10-CM

## 2025-08-12 DIAGNOSIS — F39 UNSPECIFIED MOOD [AFFECTIVE] DISORDER: ICD-10-CM

## 2025-08-12 DIAGNOSIS — F32.A DEPRESSION, UNSPECIFIED: ICD-10-CM

## 2025-08-12 PROCEDURE — ZZZZZ: CPT

## 2025-08-12 PROCEDURE — 99214 OFFICE O/P EST MOD 30 MIN: CPT

## 2025-08-13 DIAGNOSIS — F32.A DEPRESSION, UNSPECIFIED: ICD-10-CM

## 2025-08-18 ENCOUNTER — APPOINTMENT (OUTPATIENT)
Dept: PSYCHIATRY | Facility: CLINIC | Age: 55
End: 2025-08-18

## 2025-08-18 ENCOUNTER — OUTPATIENT (OUTPATIENT)
Dept: OUTPATIENT SERVICES | Facility: HOSPITAL | Age: 55
LOS: 1 days | End: 2025-08-18
Payer: MEDICAID

## 2025-08-18 DIAGNOSIS — F43.10 POST-TRAUMATIC STRESS DISORDER, UNSPECIFIED: ICD-10-CM

## 2025-08-18 DIAGNOSIS — F39 UNSPECIFIED MOOD [AFFECTIVE] DISORDER: ICD-10-CM

## 2025-08-18 PROCEDURE — 90834 PSYTX W PT 45 MINUTES: CPT

## 2025-08-19 DIAGNOSIS — F43.10 POST-TRAUMATIC STRESS DISORDER, UNSPECIFIED: ICD-10-CM

## 2025-08-19 DIAGNOSIS — F39 UNSPECIFIED MOOD [AFFECTIVE] DISORDER: ICD-10-CM

## 2025-08-25 ENCOUNTER — APPOINTMENT (OUTPATIENT)
Dept: PSYCHIATRY | Facility: CLINIC | Age: 55
End: 2025-08-25

## 2025-09-08 ENCOUNTER — APPOINTMENT (OUTPATIENT)
Dept: PSYCHIATRY | Facility: CLINIC | Age: 55
End: 2025-09-08

## 2025-09-15 ENCOUNTER — APPOINTMENT (OUTPATIENT)
Dept: PSYCHIATRY | Facility: CLINIC | Age: 55
End: 2025-09-15